# Patient Record
Sex: FEMALE | Race: ASIAN
[De-identification: names, ages, dates, MRNs, and addresses within clinical notes are randomized per-mention and may not be internally consistent; named-entity substitution may affect disease eponyms.]

---

## 2017-10-21 ENCOUNTER — HOSPITAL ENCOUNTER (INPATIENT)
Dept: HOSPITAL 74 - JER | Age: 67
LOS: 6 days | Discharge: HOME | DRG: 141 | End: 2017-10-27
Attending: FAMILY MEDICINE | Admitting: FAMILY MEDICINE
Payer: COMMERCIAL

## 2017-10-21 VITALS — BODY MASS INDEX: 24.3 KG/M2

## 2017-10-21 DIAGNOSIS — R00.0: ICD-10-CM

## 2017-10-21 DIAGNOSIS — E11.65: ICD-10-CM

## 2017-10-21 DIAGNOSIS — J45.901: Primary | ICD-10-CM

## 2017-10-21 DIAGNOSIS — Z77.22: ICD-10-CM

## 2017-10-21 DIAGNOSIS — Z79.4: ICD-10-CM

## 2017-10-21 DIAGNOSIS — I10: ICD-10-CM

## 2017-10-21 LAB
ALBUMIN SERPL-MCNC: 3.2 G/DL (ref 3.4–5)
ALP SERPL-CCNC: 78 U/L (ref 45–117)
ALT SERPL-CCNC: 24 U/L (ref 12–78)
ANION GAP SERPL CALC-SCNC: 4 MMOL/L (ref 8–16)
AST SERPL-CCNC: 8 U/L (ref 15–37)
BASOPHILS # BLD: 0.6 % (ref 0–2)
BILIRUB SERPL-MCNC: 0.7 MG/DL (ref 0.2–1)
CALCIUM SERPL-MCNC: 8.2 MG/DL (ref 8.5–10.1)
CK SERPL-CCNC: 76 IU/L (ref 26–192)
CO2 SERPL-SCNC: 33 MMOL/L (ref 21–32)
CREAT SERPL-MCNC: 0.7 MG/DL (ref 0.55–1.02)
DEPRECATED RDW RBC AUTO: 12.8 % (ref 11.6–15.6)
EOSINOPHIL # BLD: 2.2 % (ref 0–4.5)
GLUCOSE SERPL-MCNC: 212 MG/DL (ref 74–106)
MCH RBC QN AUTO: 30.6 PG (ref 25.7–33.7)
MCHC RBC AUTO-ENTMCNC: 33.1 G/DL (ref 32–36)
MCV RBC: 92.6 FL (ref 80–96)
NEUTROPHILS # BLD: 56.6 % (ref 42.8–82.8)
PLATELET # BLD AUTO: 184 K/MM3 (ref 134–434)
PMV BLD: 7.5 FL (ref 7.5–11.1)
PROT SERPL-MCNC: 6.5 G/DL (ref 6.4–8.2)
TROPONIN I SERPL-MCNC: < 0.02 NG/ML (ref 0–0.05)
WBC # BLD AUTO: 11.9 K/MM3 (ref 4–10)

## 2017-10-21 PROCEDURE — G0378 HOSPITAL OBSERVATION PER HR: HCPCS

## 2017-10-21 RX ADMIN — HEPARIN SODIUM SCH UNIT: 5000 INJECTION, SOLUTION INTRAVENOUS; SUBCUTANEOUS at 22:42

## 2017-10-21 NOTE — PDOC
History of Present Illness





<Adeline Warner - Last Filed: 10/21/17 18:08>





<Bianka Osorio - Last Filed: 10/22/17 03:54>





- General


Chief Complaint: Respiratory


Stated Complaint: DIFFICULTY BREATHING





Past History





- Past Medical History


Asthma: Yes





- Suicide/Smoking/Psychosocial Hx


Smoking History: Never smoked


Have you smoked in the past 12 months: No


Hx Alcohol Use: No


Drug/Substance Use Hx: No


Substance Use Type: None


Hx Substance Use Treatment: No





<Adeline Warner - Last Filed: 10/21/17 18:08>





<Bianka Osorio - Last Filed: 10/22/17 03:54>





- Past Medical History


Allergies/Adverse Reactions: 


 Allergies











Allergy/AdvReac Type Severity Reaction Status Date / Time


 


No Known Drug Allergies Allergy   Verified 10/21/17 17:40











Home Medications: 


Ambulatory Orders





Albuterol Sulfate Inhaler - 2 puff IN PRN 10/22/17 


Prednisone [Deltasone -] 20 mg PO DAILY 10/22/17 











*Physical Exam





- Vital Signs


 Last Vital Signs











Temp Pulse Resp BP Pulse Ox


 


 98.9 F   87   20   152/87   92 L


 


 10/21/17 17:37  10/21/17 17:37  10/21/17 17:37  10/21/17 17:37  10/21/17 17:37














<Adeline Warner - Last Filed: 10/21/17 18:08>





- Vital Signs


 Last Vital Signs











Temp Pulse Resp BP Pulse Ox


 


 98.9 F   87   20   152/87   92 L


 


 10/21/17 17:37  10/21/17 17:37  10/21/17 17:37  10/21/17 17:37  10/21/17 17:37














<Bianka Osorio - Last Filed: 10/22/17 03:54>





ED Treatment Course





- RADIOLOGY


Radiology Studies Ordered: 














 Category Date Time Status


 


 CXRPORT [CHEST X-RAY PORTABLE*] [RAD] [facility]  10/21/17 17:57 Ordered














<Adeline Warner - Last Filed: 10/21/17 18:08>





- LABORATORY


CBC & Chemistry Diagram: 


 10/21/17 18:27





 10/21/17 19:10





- ADDITIONAL ORDERS


Additional order review: 


 Laboratory  Results











  10/21/17 10/21/17 10/21/17





  19:10 19:10 18:27


 


Sodium  140  


 


Potassium  3.8  


 


Chloride  103  


 


Carbon Dioxide  33 H  


 


Anion Gap  4 L  


 


BUN  21 H  


 


Creatinine  0.7  


 


Creat Clearance w eGFR  > 60  


 


Random Glucose  212 H D  


 


Calcium  8.2 L  


 


Total Bilirubin  0.7  D  


 


AST  8 L D  


 


ALT  24  


 


Alkaline Phosphatase  78  


 


Creatine Kinase   76  Cancelled


 


Troponin I   < 0.02  Cancelled


 


B-Natriuretic Peptide  301.29 H  


 


Total Protein  6.5  


 


Albumin  3.2 L  














  10/21/17





  18:27


 


Sodium  Cancelled


 


Potassium  Cancelled


 


Chloride  Cancelled


 


Carbon Dioxide  Cancelled


 


Anion Gap  Cancelled


 


BUN  Cancelled


 


Creatinine  Cancelled


 


Creat Clearance w eGFR  Cancelled


 


Random Glucose  Cancelled


 


Calcium  Cancelled


 


Total Bilirubin  Cancelled


 


AST  Cancelled


 


ALT  Cancelled


 


Alkaline Phosphatase  Cancelled


 


Creatine Kinase 


 


Troponin I 


 


B-Natriuretic Peptide  Cancelled


 


Total Protein  Cancelled


 


Albumin  Cancelled








 











  10/21/17





  18:27


 


RBC  4.39


 


MCV  92.6


 


MCHC  33.1


 


RDW  12.8


 


MPV  7.5


 


Neutrophils %  56.6  D


 


Lymphocytes %  30.9  D


 


Monocytes %  9.7


 


Eosinophils %  2.2


 


Basophils %  0.6














- Medications


Given in the ED: 


ED Medications














Discontinued Medications














Generic Name Dose Route Start Last Admin





  Trade Name Freq  PRN Reason Stop Dose Admin


 


Albuterol/Ipratropium  1 amp 10/21/17 17:58 10/21/17 18:05





  Duoneb -  NEB 10/21/17 17:59  1 amp





  ONCE ONE   Administration


 


Albuterol/Ipratropium  1 amp 10/21/17 19:19 10/21/17 18:50





  Duoneb -  NEB 10/21/17 19:20  1 amp





  ONCE ONE   Administration


 


Magnesium Sulfate  2 gm 10/21/17 19:19 10/21/17 18:50





  Magnesium Sulfate  IVPB 10/21/17 19:20  2 gm





  ONCE ONE   Administration


 


Methylprednisolone Sodium Succinate  125 mg 10/21/17 18:07 10/21/17 18:10





  Solu-Medrol -  IVPUSH 10/21/17 18:08  125 mg





  ONCE ONE   Administration














<Bianka Osorio - Last Filed: 10/22/17 03:54>





Medical Decision Making





- Medical Decision Making





10/21/17 18:08


68yo F with y/o asthma, htn, here with c/o sob wheezing x one week. using 

albuterl minimal relief.  [facility], and was started on prednisone

, finished few days ago and sob and wheezing worse.  today went for second eval 

at asthma center, found to be hypoxic at 92%, sent to ed.


 no f/c no sob no chest pain. no leg swelling.  no h/o pe., no h/o intubations, 

last exacerbation one year ago, admitted at that time. no icu admission.





on exam awake labored breathing. bilat crackles at bases, with wheezing 

tachypnea, heart rrr nomrg. abd soft ntn nd. ext wwp no edema. no calf 

tenderness.





plan : nebs steroids iv, cbc lytse bnp, trop ekg cxr. 





<Adeline Warner - Last Filed: 10/21/17 18:08>





- Medical Decision Making


10/21/17 20:41


Pt remains at 91% O2 sat on room air despite all the treatments she has been 

receiving.


Pt will be admitted to PMD [facility]; Yoellaura is aware of the patient and wants her 

to go to Med/obs unit





10/21/17 21:56


Pt's EKG is NSR done at 20:07; heart rate 95.


After terbulating 0.25ml SQ, pt became tachycardic to 160 and BP is also 

systolic 160.








10/21/17 22:16


Diltiazem 10mg IV given to patient; Heart rate returned to 70s and BP came down 

to 107 systolic; Pt's repeat EKG demonstrates HR of 114. Pt is a bit anxious.  

Rectal temp 98.8.  She has no fever, though she appears flushed.





10/22/17 03:53


Pt was upgraded to telemetry.


Her CXR is normal: 





Patient Name: GEOVANNI RUIZ


THIS IS A PRELIMINARY REPORT FROM IMAGING ON CALL


DATE OF SERVICE: 2017-10-21 18:08:06


IMAGES: 2


EXAM: XR CHEST


HISTORY:Shortness of breath


COMPARISON: None.


FINDINGS:The cardiomediastinal silhouette is normal. The lungs are clear. No 

pleural effusion.


IMPRESSION: Normal chest.


THIS DOCUMENT HAS BEEN ELECTRONICALLY SIGNED





<Bianka Osorio - Last Filed: 10/22/17 03:54>





*DC/Admit/Observation/Transfer





<Adeline Warner - Last Filed: 10/21/17 18:08>





- Discharge Dispostion


Admit: Yes





<Bianka Osorio - Last Filed: 10/22/17 03:54>


Diagnosis at time of Disposition: 


 Asthma exacerbation, Second hand smoke exposure, Elevated blood sugar level





- Discharge Dispostion


Disposition: HOME


Condition at time of disposition: Guarded

## 2017-10-22 LAB
BASOPHILS # BLD: 0 % (ref 0–2)
DEPRECATED RDW RBC AUTO: 12.6 % (ref 11.6–15.6)
EOSINOPHIL # BLD: 0 % (ref 0–4.5)
MCH RBC QN AUTO: 30.5 PG (ref 25.7–33.7)
MCHC RBC AUTO-ENTMCNC: 32.7 G/DL (ref 32–36)
MCV RBC: 93.2 FL (ref 80–96)
NEUTROPHILS # BLD: 82.5 % (ref 42.8–82.8)
PLATELET # BLD AUTO: 177 K/MM3 (ref 134–434)
PMV BLD: 7.2 FL (ref 7.5–11.1)
WBC # BLD AUTO: 11.8 K/MM3 (ref 4–10)

## 2017-10-22 RX ADMIN — HEPARIN SODIUM SCH UNIT: 5000 INJECTION, SOLUTION INTRAVENOUS; SUBCUTANEOUS at 09:19

## 2017-10-22 RX ADMIN — ALBUTEROL SULFATE SCH AMP: 2.5 SOLUTION RESPIRATORY (INHALATION) at 11:47

## 2017-10-22 RX ADMIN — ALBUTEROL SULFATE SCH AMP: 2.5 SOLUTION RESPIRATORY (INHALATION) at 06:45

## 2017-10-22 RX ADMIN — BUDESONIDE AND FORMOTEROL FUMARATE DIHYDRATE SCH PUFF: 80; 4.5 AEROSOL RESPIRATORY (INHALATION) at 16:53

## 2017-10-22 RX ADMIN — GUAIFENESIN PRN ML: 100 SOLUTION ORAL at 14:43

## 2017-10-22 RX ADMIN — HEPARIN SODIUM SCH UNIT: 5000 INJECTION, SOLUTION INTRAVENOUS; SUBCUTANEOUS at 21:25

## 2017-10-22 RX ADMIN — METHYLPREDNISOLONE SODIUM SUCCINATE SCH MG: 40 INJECTION, POWDER, FOR SOLUTION INTRAMUSCULAR; INTRAVENOUS at 21:24

## 2017-10-22 RX ADMIN — METHYLPREDNISOLONE SODIUM SUCCINATE SCH MG: 40 INJECTION, POWDER, FOR SOLUTION INTRAMUSCULAR; INTRAVENOUS at 14:57

## 2017-10-22 RX ADMIN — ALBUTEROL SULFATE SCH AMP: 1.25 SOLUTION RESPIRATORY (INHALATION) at 23:04

## 2017-10-22 RX ADMIN — MONTELUKAST SODIUM SCH MG: 10 TABLET, COATED ORAL at 21:25

## 2017-10-22 RX ADMIN — BUDESONIDE AND FORMOTEROL FUMARATE DIHYDRATE SCH PUFF: 80; 4.5 AEROSOL RESPIRATORY (INHALATION) at 21:29

## 2017-10-22 RX ADMIN — ALBUTEROL SULFATE SCH: 2.5 SOLUTION RESPIRATORY (INHALATION) at 00:05

## 2017-10-22 NOTE — HP
Admitting History and Physical





- Primary Care Physician


PCP: Halima Eden





- Admission


Chief Complaint: Asthma exacerbation


History of Present Illness: 


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 

asthma, htn, here with c/o sob wheezing x one week. using rescue inhaler with 

minimal relief.  [facility], and was started on prednisone, 

finished few days ago and sob and wheezing became worse.  Went for second eval 

at asthma center, to have spo2 92%, sent to ed.  No h/o pe., no h/o intubations

, last exacerbation one year ago, admitted at that time. no icu admissions.





She states she received influenza vaccine a few days prior to her getting sick 

and also took care of a sick toddler with URI


went to [place] in Aug-September. Has PPD implanted this AM on her LFA








History Source: Patient


Limitations to Obtaining History: No Limitations





- Past Medical History


CNS: No: Alzheimer's


Cardiovascular: Yes: Other (routine nuclear stress test about 1 ya -. 

reportedly normal).  No: AFIB


Pulmonary: Yes: Asthma ( -. not active)


Gastrointestinal: No: Ascites


Hepatobiliary: No: Cirrhosis


Renal/: No: Renal Failure


Heme/Onc: No: Anemia


Infectious Disease: No: AIDS


Psych: No: Addictions


Musculoskeletal: No: Chronic low back pain


Rheumatology: No: Fibromyalgia


ENT: No: Allergic Rhinitis


Endocrine: Yes: Diabetes Mellitus





- Smoking History


Smoking history: Never smoked


Have you smoked in the past 12 months: No





- Alcohol/Substance Use


Hx Alcohol Use: No


History of Substance Use: reports: None





- Social History


History of Recent Travel: No





Home Medications





- Allergies


Allergies/Adverse Reactions: 


 Allergies











Allergy/AdvReac Type Severity Reaction Status Date / Time


 


No Known Drug Allergies Allergy   Verified 10/21/17 17:40














- Home Medications


Home Medications: 


Ambulatory Orders





Albuterol Sulfate Inhaler - 2 puff IN PRN 10/22/17 


Prednisone [Deltasone -] 20 mg PO DAILY 10/22/17 











Review of Systems





- Review of Systems


Constitutional: reports: Chills, Fever, Night Sweats


Eyes: reports: No Symptoms


HENT: reports: No Symptoms


Neck: reports: No Symptoms


Cardiovascular: reports: Shortness of Breath


Respiratory: reports: Cough, SOB, Wheezing


Gastrointestinal: reports: No Symptoms


Genitourinary: reports: No Symptoms


Breasts: reports: No Symptoms Reported


Musculoskeletal: reports: No Symptoms


Integumentary: reports: No Symptoms


Neurological: reports: No Symptoms


Endocrine: reports: Increased Thirst


Hematology/Lymphatic: reports: No Symptoms


Psychiatric: reports: No Symptoms





Physical Examination


Vital Signs: 


 Vital Signs











Temperature  98.0 F   10/22/17 20:16


 


Pulse Rate  82   10/22/17 20:16


 


Respiratory Rate  20   10/22/17 20:16


 


Blood Pressure  143/74   10/22/17 20:16


 


O2 Sat by Pulse Oximetry (%)  94 L  10/22/17 20:16











Findings/Remarks: 


on BIPAP currently, helps her breathe better


Constitutional: Yes: Well Nourished, No Distress, Calm


Cardiovascular: Yes: Regular Rate and Rhythm


Respiratory: Yes: Regular


Gastrointestinal: Yes: Normal Bowel Sounds


Musculoskeletal: Yes: WNL


Extremities: Yes: WNL


Edema: No


Peripheral Pulses WNL: Yes


Neurological: Yes: Alert, Oriented


Psychiatric: Yes: Alert, Oriented


Labs: 


 CBC, BMP





 10/22/17 05:35 











Imaging





- Results


Chest X-ray: Report Reviewed





Problem List





- Problems


(1) Asthma exacerbation


Assessment/Plan: 


-seen by Pulmonary


-IV steroids


-neb tx


-bipap


-nasal o2 to maintain Spo2> 90%





Code(s): J45.901 - UNSPECIFIED ASTHMA WITH (ACUTE) EXACERBATION   





(2) Second hand smoke exposure


Code(s): Z77.22 - CNTCT W AND EXPSR TO ENVIRON TOBACCO SMOKE (ACUTE) (CHRONIC)





(3) Diabetes type 2, controlled


Assessment/Plan: 


-recheck A1c


Code(s): E11.9 - TYPE 2 DIABETES MELLITUS WITHOUT COMPLICATIONS   





(4) Tachycardia


Assessment/Plan: 


-seen by cardiology


Code(s): R00.0 - TACHYCARDIA, UNSPECIFIED

## 2017-10-22 NOTE — CON.CARD
Consult


Consult Specialty:: Cardiology


Reason for Consultation:: Tachcardia





- History of Present Illness


Chief Complaint: Asthma exacerbation


History of Present Illness: 


This is a 67 year old female with a PMH of asthma. She was complaining of GRIMALDO 

and chest pain in 8/17 and had a nuclear stress test and an echocardiogram at 

[facility], both she was told were normal. She presents now with an asthma 

exacerbation and is noted to have tachycardia on the telem monitor. In 

reviewing telemtry, it appears to me to be sinus tachycardia with periods of 

MAT. 





EKG 10/21/17 NSR at 82 BPM with normal intervals, normal axis and NSSTTW 

changes. 





- Past Medical History


CNS: No: Alzheimer's


Cardio/Vascular: Yes: Other (routine nuclear stress test about 1 ya -. 

reportedly normal).  No: AFIB


Pulmonary: Yes: Asthma ( -. not active)


Gastrointestinal: No: Ascites


Hepatobiliary: No: Cirrhosis


Renal/: No: Renal Failure


Infectious Disease: No: AIDS


Psych: No: Addictions


Musculoskeletal: No: Chronic low back pain


Rheumatology: No: Fibromyalgia


ENT: No: Allergic Rhinitis


Endocrine: Yes: Diabetes Mellitus





- Alcohol/Substance Use


Hx Alcohol Use: No


History of Substance Use: reports: None





- Smoking History


Smoking history: Never smoked


Have you smoked in the past 12 months: No





- Social History


History of Recent Travel: No





Home Medications





- Allergies


Allergies/Adverse Reactions: 


 Allergies











Allergy/AdvReac Type Severity Reaction Status Date / Time


 


No Known Drug Allergies Allergy   Verified 10/21/17 17:40














- Home Medications


Home Medications: 


Ambulatory Orders





Albuterol Sulfate Inhaler - 2 puff IN PRN 10/22/17 


Prednisone [Deltasone -] 20 mg PO DAILY 10/22/17 











Review of Systems


Unable to obtain ROS, reason: As per HPI


Vital Signs: 


 Vital Signs











Temperature  99 F   10/22/17 14:00


 


Pulse Rate  71   10/22/17 14:00


 


Respiratory Rate  20   10/22/17 14:00


 


Blood Pressure  163/69   10/22/17 14:00


 


O2 Sat by Pulse Oximetry (%)  95   10/22/17 09:00











Constitutional: Yes: No Distress


HENT: Yes: WNL


Neck: Yes: WNL


Respiratory: Yes: Wheezes (Bilateral expiratory wheezing)


Gastrointestinal: Yes: Soft


Heart Sounds: Yes: S1, S2 (NL S1S2, no MRHG)


Edema: No


Neurological: Yes: Alert, Oriented (Grossly nonfocal)





- Other Data


Labs, Other Data: 


 CBC, BMP





 10/22/17 05:35 











Assessment/Plan


Tachycardia


Presently with sinus tachycardia


Earlier SVT noted that appeared to be MAT





The MAT should resolve as the underlying pulmonary disease is being treated


Keep monitored for now


Would not attempt to artificially slow the HR at this point





Will follow with you

## 2017-10-22 NOTE — CON.PULM
Consult


Consult Specialty:: PULMONARY


Referred by:: LOLIS


Reason for Consultation:: ASTHMA





- History of Present Illness


Chief Complaint: SOB/COUGH/WHEEZE


History of Present Illness: 





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 

asthma, htn, here with c/o sob wheezing x one week. using rescue inhaler with 

minimal relief.  [facility], and was started on prednisone, 

finished few days ago and sob and wheezing became worse.  Went for second eval 

at asthma center, to have spo2 92%, sent to ed.  No h/o pe., no h/o intubations

, last exacerbation one year ago, admitted at that time. no icu admissions.








- History Source


History Provided By: Patient, Medical Record


Limitations to Obtaining History: No Limitations





- Past Medical History


CNS: No: Alzheimer's


Cardio/Vascular: Yes: Other (routine nuclear stress test about 1 ya -. 

reportedly normal).  No: AFIB


Pulmonary: Yes: Asthma ( -. not active)


Gastrointestinal: No: Ascites


Hepatobiliary: No: Cirrhosis


Renal/: No: Renal Failure


Reproductive: Yes: Postmenopausal


Heme/Onc: No: Anemia


Infectious Disease: No: AIDS


Psych: No: Addictions


Musculoskeletal: No: Chronic low back pain


Rheumatology: No: Fibromyalgia


ENT: No: Allergic Rhinitis


Endocrine: Yes: Diabetes Mellitus





- Alcohol/Substance Use


Hx Alcohol Use: No


History of Substance Use: reports: None





- Smoking History


Smoking history: Never smoked


Have you smoked in the past 12 months: No





- Social History


Place of Birth: Other


History of Recent Travel: No





Home Medications





- Allergies


Allergies/Adverse Reactions: 


 Allergies











Allergy/AdvReac Type Severity Reaction Status Date / Time


 


No Known Drug Allergies Allergy   Verified 10/21/17 17:40














- Home Medications


Home Medications: 


Ambulatory Orders





Albuterol Sulfate Inhaler - 2 puff IN PRN 10/22/17 


Prednisone [Deltasone -] 20 mg PO DAILY 10/22/17 











Family Disease History





- Family Disease History


Family History: Unremarkable





Review of Systems





- Review of Systems


Cardiovascular: reports: Palpitations, Shortness of Breath.  denies: Chest Pain


Respiratory: reports: Cough, Exercise Intolerance, SOB, SOB on Exertion, 

Wheezing.  denies: Hemoptysis





Physical Exam


Vital Sings: 


 Vital Signs











Temperature  97.8 F   10/22/17 09:00


 


Pulse Rate  77   10/22/17 09:00


 


Respiratory Rate  20   10/22/17 09:00


 


Blood Pressure  122/68   10/22/17 09:00


 


O2 Sat by Pulse Oximetry (%)  95   10/22/17 09:00











Constitutional: Yes: Calm


Eyes: Yes: EOM Intact


HENT: Yes: Normocephalic


Neck: Yes: Trachea Midline


Cardiovascular: Yes: Tachycardia, S1, S2


Respiratory: Yes: Diminished


Gastrointestinal: Yes: Normal Bowel Sounds


Edema: No


Labs: 


 CBC, BMP





 10/22/17 05:35 











Imaging





- Results


Chest X-ray: Report Reviewed, Image Reviewed





Problem List





- Problems


(1) Asthma exacerbation


Code(s): J45.901 - UNSPECIFIED ASTHMA WITH (ACUTE) EXACERBATION   





(2) Elevated blood sugar level


Code(s): R73.9 - HYPERGLYCEMIA, UNSPECIFIED





(3) Diabetes type 2, controlled


Code(s): E11.9 - TYPE 2 DIABETES MELLITUS WITHOUT COMPLICATIONS   








Assessment/Plan


ACUTE ASTHMATIC BRONCHITIS/NO RADIOGRAPHIC EVIDENCE OF PNEUMONIA


HYPERGLYCEMIA


MARK ANTHONY CAUSES TACHYCARDIA





IV STEROIDS/ANTIBIOTICS/ICS/LABA REDUCED DOSE OF MARK ANTHONY VIA NEB


MONITOR ON TELE


DAILY PEAK FLOW


CHECK IGE LEVEL/RAST





THANK YOU





WILL FOLLOW WITH YOU





RILEY RODRIGUEZ MD

## 2017-10-22 NOTE — EKG
Test Reason : 

Blood Pressure : ***/*** mmHG

Vent. Rate : 082 BPM     Atrial Rate : 082 BPM

   P-R Int : 138 ms          QRS Dur : 086 ms

    QT Int : 378 ms       P-R-T Axes : 044 002 080 degrees

   QTc Int : 441 ms

 

NORMAL SINUS RHYTHM

NONSPECIFIC T WAVE ABNORMALITY

ABNORMAL ECG

WHEN COMPARED WITH ECG OF 15-SEP-2016 08:31,

NONSPECIFIC T WAVE ABNORMALITY, WORSE IN LATERAL LEADS

CLINICAL CORRELATION IS RECOMMENDED

Confirmed by CARLOS NICHOLAS, CRISTHIAN ([id number]) on 10/22/2017 10:52:07 AM

 

Referred By:             Confirmed By:CRISTHIAN GONZALEZ MD

## 2017-10-23 LAB
ALBUMIN SERPL-MCNC: 3.1 G/DL (ref 3.4–5)
ALP SERPL-CCNC: 74 U/L (ref 45–117)
ALT SERPL-CCNC: 25 U/L (ref 12–78)
ANION GAP SERPL CALC-SCNC: 11 MMOL/L (ref 8–16)
AST SERPL-CCNC: 9 U/L (ref 15–37)
BASOPHILS # BLD: 0.4 % (ref 0–2)
BILIRUB SERPL-MCNC: 0.5 MG/DL (ref 0.2–1)
CALCIUM SERPL-MCNC: 8.2 MG/DL (ref 8.5–10.1)
CHOLEST SERPL-MCNC: 203 MG/DL (ref 50–200)
CO2 SERPL-SCNC: 26 MMOL/L (ref 21–32)
CREAT SERPL-MCNC: 0.5 MG/DL (ref 0.55–1.02)
DEPRECATED RDW RBC AUTO: 12.6 % (ref 11.6–15.6)
EOSINOPHIL # BLD: 0.1 % (ref 0–4.5)
GLUCOSE SERPL-MCNC: 229 MG/DL (ref 74–106)
MCH RBC QN AUTO: 30.2 PG (ref 25.7–33.7)
MCHC RBC AUTO-ENTMCNC: 32.7 G/DL (ref 32–36)
MCV RBC: 92.2 FL (ref 80–96)
NEUTROPHILS # BLD: 84.3 % (ref 42.8–82.8)
PLATELET # BLD AUTO: 182 K/MM3 (ref 134–434)
PMV BLD: 7.6 FL (ref 7.5–11.1)
PROT SERPL-MCNC: 6.5 G/DL (ref 6.4–8.2)
WBC # BLD AUTO: 16.2 K/MM3 (ref 4–10)

## 2017-10-23 RX ADMIN — METHYLPREDNISOLONE SODIUM SUCCINATE SCH MG: 40 INJECTION, POWDER, FOR SOLUTION INTRAMUSCULAR; INTRAVENOUS at 08:52

## 2017-10-23 RX ADMIN — HEPARIN SODIUM SCH UNIT: 5000 INJECTION, SOLUTION INTRAVENOUS; SUBCUTANEOUS at 21:42

## 2017-10-23 RX ADMIN — METHYLPREDNISOLONE SODIUM SUCCINATE SCH MG: 40 INJECTION, POWDER, FOR SOLUTION INTRAMUSCULAR; INTRAVENOUS at 21:42

## 2017-10-23 RX ADMIN — CEFTRIAXONE SCH MLS/HR: 1 INJECTION, SOLUTION INTRAVENOUS at 10:21

## 2017-10-23 RX ADMIN — MONTELUKAST SODIUM SCH MG: 10 TABLET, COATED ORAL at 21:44

## 2017-10-23 RX ADMIN — IPRATROPIUM BROMIDE AND ALBUTEROL SULFATE PRN AMP: .5; 3 SOLUTION RESPIRATORY (INHALATION) at 22:43

## 2017-10-23 RX ADMIN — METHYLPREDNISOLONE SODIUM SUCCINATE SCH MG: 40 INJECTION, POWDER, FOR SOLUTION INTRAMUSCULAR; INTRAVENOUS at 02:30

## 2017-10-23 RX ADMIN — BUDESONIDE AND FORMOTEROL FUMARATE DIHYDRATE SCH PUFF: 80; 4.5 AEROSOL RESPIRATORY (INHALATION) at 10:31

## 2017-10-23 RX ADMIN — TIOTROPIUM BROMIDE SCH PUFF: 18 CAPSULE ORAL; RESPIRATORY (INHALATION) at 09:09

## 2017-10-23 RX ADMIN — BUDESONIDE AND FORMOTEROL FUMARATE DIHYDRATE SCH PUFF: 160; 4.5 AEROSOL RESPIRATORY (INHALATION) at 21:44

## 2017-10-23 RX ADMIN — HEPARIN SODIUM SCH UNIT: 5000 INJECTION, SOLUTION INTRAVENOUS; SUBCUTANEOUS at 09:00

## 2017-10-23 RX ADMIN — INSULIN ASPART SCH UNITS: 100 INJECTION, SOLUTION INTRAVENOUS; SUBCUTANEOUS at 21:42

## 2017-10-23 RX ADMIN — ALBUTEROL SULFATE SCH AMP: 1.25 SOLUTION RESPIRATORY (INHALATION) at 06:25

## 2017-10-23 RX ADMIN — METHYLPREDNISOLONE SODIUM SUCCINATE SCH MG: 40 INJECTION, POWDER, FOR SOLUTION INTRAMUSCULAR; INTRAVENOUS at 14:54

## 2017-10-23 RX ADMIN — GUAIFENESIN PRN ML: 100 SOLUTION ORAL at 02:44

## 2017-10-23 NOTE — PN
Progress Note, Physician


Chief Complaint: 


AWAKE ON BIPAP


MILD DISTRESS





- Current Medication List


Current Medications: 


Active Medications





Acetaminophen (Tylenol -)  650 mg PO Q6H PRN


   PRN Reason: FEVER OR PAIN


Albuterol/Ipratropium (Duoneb -)  1 amp NEB Q4H PRN


   PRN Reason: SHORTNESS OF BREATH


Budesonide/Formoterol Fumarate (Symbicort 160/4.5mcg -)  2 puff IH BID ADAM


Guaifenesin (Robitussin -)  10 ml PO Q6H PRN


   Last Admin: 10/23/17 02:44 Dose:  10 ml


Heparin Sodium (Porcine) (Heparin -)  5,000 unit SQ BID [facility]


   Last Admin: 10/23/17 09:00 Dose:  5,000 unit


CEFTRIAXONE 1 G/50 ML PREMIX (Ceftriaxone 1 Gm-D5w Bag)  50 mls @ 100 mls/hr 

IVPB DAILY [facility]


   Stop: 10/24/17 10:30


   Last Admin: 10/23/17 10:21 Dose:  100 mls/hr


Methylprednisolone Sodium Succinate (Solu-Medrol -)  40 mg IVPUSH Q6H-IV [facility]


   Last Admin: 10/23/17 14:54 Dose:  40 mg


Montelukast Sodium (Singulair -)  10 mg PO HS [facility]


   Last Admin: 10/22/17 21:25 Dose:  10 mg


Tiotropium Bromide (Spiriva -)  1 puff IH DAILY [facility]


   Last Admin: 10/23/17 09:09 Dose:  1 puff











- Objective


Vital Signs: 


 Vital Signs











Temperature  97.8 F   10/23/17 15:02


 


Pulse Rate  68   10/23/17 15:02


 


Respiratory Rate  20   10/23/17 15:02


 


Blood Pressure  148/68   10/23/17 15:02


 


O2 Sat by Pulse Oximetry (%)  99   10/23/17 14:00











Constitutional: Yes: Mild Distress


Eyes: Yes: WNL


HENT: Yes: WNL


Neck: Yes: WNL


Cardiovascular: Yes: WNL


Respiratory: Yes: On BiPap, Rhonchi, SOB


Gastrointestinal: Yes: WNL


Genitourinary: Yes: WNL


Musculoskeletal: Yes: WNL


Extremities: Yes: WNL


Edema: No


Peripheral Pulses WNL: Yes


Integumentary: Yes: WNL


Wound/Incision: Yes: Clean/Dry


Neurological: Yes: WNL


...Motor Strength: WNL


Psychiatric: Yes: WNL





Problem List





- Problems


(1) Asthma exacerbation


Code(s): J45.901 - UNSPECIFIED ASTHMA WITH (ACUTE) EXACERBATION   Qualifiers: 


     Asthma severity: moderate





(2) Tachycardia


Code(s): R00.0 - TACHYCARDIA, UNSPECIFIED





(3) Diabetes type 2, controlled


Code(s): E11.9 - TYPE 2 DIABETES MELLITUS WITHOUT COMPLICATIONS   Qualifiers: 


     Diabetes mellitus complication status: without complication








Assessment/Plan


IV STEROIDS


SSI


ADA


BIPAP


RESP EVAL APPRECIATED


NEBS


OOB TO CHAIR

## 2017-10-23 NOTE — PN
Progress Note, Physician


History of Present Illness: 


PULMONARY





ALERT,FEELING BETTER,LESS DYSPNEIC





- Current Medication List


Current Medications: 


Active Medications





Acetaminophen (Tylenol -)  650 mg PO Q6H PRN


   PRN Reason: FEVER OR PAIN


Albuterol Sulfate (Ventolin 0.042trength) -)  1 amp [facility]B [facility]


   Last Admin: 10/23/17 06:25 Dose:  1 amp


Budesonide/Formoterol Fumarate (Symbicort 80/4.5mcg -)  1 puff IH BID [facility]


   Last Admin: 10/23/17 10:31 Dose:  1 puff


Guaifenesin (Robitussin -)  10 ml PO Q6H PRN


   Last Admin: 10/23/17 02:44 Dose:  10 ml


Heparin Sodium (Porcine) (Heparin -)  5,000 unit SQ BID [facility]


   Last Admin: 10/23/17 09:00 Dose:  5,000 unit


CEFTRIAXONE 1 G/50 ML PREMIX (Ceftriaxone 1 Gm-D5w Bag)  50 mls @ 100 mls/hr 

IVPB DAILY [facility]


   Stop: 10/24/17 10:30


   Last Admin: 10/23/17 10:21 Dose:  100 mls/hr


Methylprednisolone Sodium Succinate (Solu-Medrol -)  40 mg IVPUSH Q6H-IV [facility]


   Last Admin: 10/23/17 08:52 Dose:  40 mg


Montelukast Sodium (Singulair -)  10 mg PO HS [facility]


   Last Admin: 10/22/17 21:25 Dose:  10 mg


Tiotropium Bromide (Spiriva -)  1 puff IH DAILY [facility]


   Last Admin: 10/23/17 09:09 Dose:  1 puff











- Objective


Vital Signs: 


 Vital Signs











Temperature  97.3 F L  10/23/17 06:00


 


Pulse Rate  62   10/23/17 06:00


 


Respiratory Rate  20   10/23/17 06:00


 


Blood Pressure  131/70   10/23/17 06:00


 


O2 Sat by Pulse Oximetry (%)  96   10/23/17 07:13











Constitutional: Yes: Well Nourished, Calm


Eyes: Yes: WNL


HENT: Yes: WNL


Neck: Yes: WNL


Cardiovascular: Yes: Regular Rate and Rhythm, S1, S2


Respiratory: Yes: Diminished, Wheezes (SCATTERED DEVYN WHEEZES)


Gastrointestinal: Yes: Normal Bowel Sounds, Soft


Extremities: Yes: WNL


Edema: No


Labs: 


 CBC, BMP





 10/23/17 06:25 





 10/23/17 06:25 











Assessment/Plan


Problem List





- Problems


(1) Asthma exacerbation


Code(s): J45.901 - UNSPECIFIED ASTHMA WITH (ACUTE) EXACERBATION   





(2) Elevated blood sugar level


Code(s): R73.9 - HYPERGLYCEMIA, UNSPECIFIED





(3) Diabetes type 2, controlled


Code(s): E11.9 - TYPE 2 DIABETES MELLITUS WITHOUT COMPLICATIONS   








Assessment/Plan


ACUTE ASTHMATIC BRONCHITIS/NO RADIOGRAPHIC EVIDENCE OF PNEUMONIA


HYPERGLYCEMIA








IV STEROIDS


ANTIBIOTICS


ICS


LABA


NEBS PRN


MONITOR ON TELE


DAILY PEAK FLOW


CHECK IGE LEVEL/RAST OUTPATIENT


PFTS OUTPATIENT





DR MAI

## 2017-10-23 NOTE — PN
Progress Note, Physician


Chief Complaint: 


SOB improving but still wheezy


History of Present Illness: 


This is a 67 year old female with a PMH of asthma. She was complaining of GRIMALDO 

and chest pain in 8/17 and had a nuclear stress test and an echocardiogram at 

[facility], both she was told were normal. She presents now with an asthma 

exacerbation and is noted to have tachycardia on the telem monitor. In 

reviewing telemtry, it appears to me to be sinus tachycardia with periods of 

MAT. 





EKG 10/21/17 NSR at 82 BPM with normal intervals, normal axis and NSSTTW 

changes.





- Current Medication List


Current Medications: 


Active Medications





Acetaminophen (Tylenol -)  650 mg PO Q6H PRN


   PRN Reason: FEVER OR PAIN


Albuterol/Ipratropium (Duoneb -)  1 amp NEB Q4H PRN


   PRN Reason: SHORTNESS OF BREATH


Budesonide/Formoterol Fumarate (Symbicort 160/4.5mcg -)  2 puff IH BID ADAM


Guaifenesin (Robitussin -)  10 ml PO Q6H PRN


   Last Admin: 10/23/17 02:44 Dose:  10 ml


Heparin Sodium (Porcine) (Heparin -)  5,000 unit SQ BID ADAM


   Last Admin: 10/23/17 09:00 Dose:  5,000 unit


CEFTRIAXONE 1 G/50 ML PREMIX (Ceftriaxone 1 Gm-D5w Bag)  50 mls @ 100 mls/hr 

IVPB DAILY [facility]


   Stop: 10/24/17 10:30


   Last Admin: 10/23/17 10:21 Dose:  100 mls/hr


Methylprednisolone Sodium Succinate (Solu-Medrol -)  40 mg IVPUSH Q6H-IV ADAM


   Last Admin: 10/23/17 14:54 Dose:  40 mg


Montelukast Sodium (Singulair -)  10 mg PO HS ADAM


   Last Admin: 10/22/17 21:25 Dose:  10 mg


Tiotropium Bromide (Spiriva -)  1 puff IH DAILY [facility]


   Last Admin: 10/23/17 09:09 Dose:  1 puff











- Objective


Vital Signs: 


 Vital Signs











Temperature  97.8 F   10/23/17 15:02


 


Pulse Rate  68   10/23/17 15:02


 


Respiratory Rate  20   10/23/17 15:02


 


Blood Pressure  148/68   10/23/17 15:02


 


O2 Sat by Pulse Oximetry (%)  99   10/23/17 14:00











Constitutional: Yes: No Distress


Neck: Yes: Supple


Cardiovascular: Yes: Regular Rate and Rhythm, S1, S2.  No: JVD, Murmur


Respiratory: Yes: Wheezes (b/l exp wheeze)


Gastrointestinal: Yes: Soft


Edema: No


Labs: 


 CBC, BMP





 10/23/17 06:25 





 10/23/17 06:25 











Problem List





- Problems


(1) Asthma exacerbation


Code(s): J45.901 - UNSPECIFIED ASTHMA WITH (ACUTE) EXACERBATION   








Assessment/Plan


This is a 67 year old female with a PMH of asthma. She was complaining of GRIMALDO 

and chest pain in 8/17 and had a nuclear stress test and an echocardiogram at 

[facility], both she was told were normal. She presents now with an asthma 

exacerbation and noted to have tachycardia with MAT   





EKG 10/21/17 NSR at 82 BPM with normal intervals, normal axis and NSSTTW 

lateral changes.





-Continue to treat as per pulmonary.  Improving from symptom standpoint but 

still with exp wheeze


Continue to monitor on telemetry


Please obtain results of Stress/echo/and ekg from outside doctor in August.

## 2017-10-24 RX ADMIN — HEPARIN SODIUM SCH UNIT: 5000 INJECTION, SOLUTION INTRAVENOUS; SUBCUTANEOUS at 21:19

## 2017-10-24 RX ADMIN — TIOTROPIUM BROMIDE SCH PUFF: 18 CAPSULE ORAL; RESPIRATORY (INHALATION) at 09:51

## 2017-10-24 RX ADMIN — METHYLPREDNISOLONE SODIUM SUCCINATE SCH MG: 40 INJECTION, POWDER, FOR SOLUTION INTRAMUSCULAR; INTRAVENOUS at 03:15

## 2017-10-24 RX ADMIN — INSULIN ASPART SCH UNITS: 100 INJECTION, SOLUTION INTRAVENOUS; SUBCUTANEOUS at 17:51

## 2017-10-24 RX ADMIN — METHYLPREDNISOLONE SODIUM SUCCINATE SCH MG: 40 INJECTION, POWDER, FOR SOLUTION INTRAMUSCULAR; INTRAVENOUS at 09:51

## 2017-10-24 RX ADMIN — INSULIN ASPART SCH UNITS: 100 INJECTION, SOLUTION INTRAVENOUS; SUBCUTANEOUS at 11:33

## 2017-10-24 RX ADMIN — IPRATROPIUM BROMIDE AND ALBUTEROL SULFATE PRN AMP: .5; 3 SOLUTION RESPIRATORY (INHALATION) at 14:28

## 2017-10-24 RX ADMIN — HEPARIN SODIUM SCH UNIT: 5000 INJECTION, SOLUTION INTRAVENOUS; SUBCUTANEOUS at 09:51

## 2017-10-24 RX ADMIN — MONTELUKAST SODIUM SCH MG: 10 TABLET, COATED ORAL at 21:20

## 2017-10-24 RX ADMIN — BUDESONIDE AND FORMOTEROL FUMARATE DIHYDRATE SCH PUFF: 160; 4.5 AEROSOL RESPIRATORY (INHALATION) at 09:51

## 2017-10-24 RX ADMIN — METHYLPREDNISOLONE SODIUM SUCCINATE SCH MG: 40 INJECTION, POWDER, FOR SOLUTION INTRAMUSCULAR; INTRAVENOUS at 14:42

## 2017-10-24 RX ADMIN — METHYLPREDNISOLONE SODIUM SUCCINATE SCH MG: 40 INJECTION, POWDER, FOR SOLUTION INTRAMUSCULAR; INTRAVENOUS at 21:16

## 2017-10-24 RX ADMIN — CEFTRIAXONE SCH MLS/HR: 1 INJECTION, SOLUTION INTRAVENOUS at 09:51

## 2017-10-24 RX ADMIN — INSULIN ASPART SCH UNITS: 100 INJECTION, SOLUTION INTRAVENOUS; SUBCUTANEOUS at 21:19

## 2017-10-24 RX ADMIN — BUDESONIDE AND FORMOTEROL FUMARATE DIHYDRATE SCH PUFF: 160; 4.5 AEROSOL RESPIRATORY (INHALATION) at 21:21

## 2017-10-24 RX ADMIN — INSULIN ASPART SCH UNITS: 100 INJECTION, SOLUTION INTRAVENOUS; SUBCUTANEOUS at 06:13

## 2017-10-24 NOTE — PN
Progress Note, Physician


Chief Complaint: 


Still wheezy but improving


Tele: sinus with no arrhythmic events


History of Present Illness: 


This is a 67 year old female with a PMH of asthma. She was complaining of GRIMALDO 

and chest pain in 8/17 and had a nuclear stress test and an echocardiogram at 

[facility], both she was told were normal. She presents now with an asthma 

exacerbation and is noted to have tachycardia on the telem monitor. In 

reviewing telemtry, it appears to me to be sinus tachycardia with periods of 

MAT. 





EKG 10/21/17 NSR at 82 BPM with normal intervals, normal axis and NSSTTW 

changes.





- Current Medication List


Current Medications: 


Active Medications





Acetaminophen (Tylenol -)  650 mg PO Q6H PRN


   PRN Reason: FEVER OR PAIN


Albuterol/Ipratropium (Duoneb -)  1 amp NEB Q4H PRN


   PRN Reason: SHORTNESS OF BREATH


   Last Admin: 10/23/17 22:43 Dose:  1 amp


Budesonide/Formoterol Fumarate (Symbicort 160/4.5mcg -)  2 puff IH BID ADAM


   Last Admin: 10/24/17 09:51 Dose:  2 puff


Guaifenesin (Robitussin -)  10 ml PO Q6H PRN


   Last Admin: 10/23/17 02:44 Dose:  10 ml


Heparin Sodium (Porcine) (Heparin -)  5,000 unit SQ BID ADAM


   Last Admin: 10/24/17 09:51 Dose:  5,000 unit


Insulin Aspart (Novolog Vial Sliding Scale -)  1 vial SQ ACHS ADAM


   PRN Reason: Protocol


   Last Admin: 10/24/17 11:33 Dose:  8 units


Methylprednisolone Sodium Succinate (Solu-Medrol -)  40 mg IVPUSH Q6H-IV ADAM


   Last Admin: 10/24/17 09:51 Dose:  40 mg


Montelukast Sodium (Singulair -)  10 mg PO HS ADAM


   Last Admin: 10/23/17 21:44 Dose:  10 mg


Tiotropium Bromide (Spiriva -)  1 puff IH DAILY [facility]


   Last Admin: 10/24/17 09:51 Dose:  1 puff











- Objective


Vital Signs: 


 Vital Signs











Temperature  97.8 F   10/24/17 05:52


 


Pulse Rate  54 L  10/24/17 11:51


 


Respiratory Rate  20   10/24/17 05:52


 


Blood Pressure  137/58   10/24/17 05:52


 


O2 Sat by Pulse Oximetry (%)  98   10/24/17 11:51











Neck: Yes: Supple


Cardiovascular: Yes: Regular Rate and Rhythm, S1, S2.  No: JVD, Murmur


Respiratory: Yes: Wheezes (diffuse exp and insp wheeze)


Gastrointestinal: Yes: Normal Bowel Sounds, Soft


Edema: No





Problem List





- Problems


(1) Asthma exacerbation


Code(s): J45.901 - UNSPECIFIED ASTHMA WITH (ACUTE) EXACERBATION   Qualifiers: 


     Asthma severity: moderate








Assessment/Plan


This is a 67 year old female with a PMH of asthma. She was complaining of GRIMALDO 

and chest pain in 8/17 and had a nuclear stress test and an echocardiogram at 

[facility], both she was told were normal. She presents now with an asthma 

exacerbation and noted to have tachycardia with MAT   





EKG 10/21/17 NSR at 82 BPM with normal intervals, normal axis and NSSTTW 

lateral changes.





-Continue to treat as per pulmonary.  Improving from symptom standpoint but 

still with significant wheeze on exam


No arrhythmias on telemetry monitor


Please obtain results of Stress/echo/and ekg from outside doctor in August.





No further cardiac testing at this time





Will sign off

## 2017-10-24 NOTE — PN
Progress Note, Physician


History of Present Illness: 


pulmonary





alert,still congested,+cough





- Current Medication List


Current Medications: 


Active Medications





Acetaminophen (Tylenol -)  650 mg PO Q6H PRN


   PRN Reason: FEVER OR PAIN


Albuterol/Ipratropium (Duoneb -)  1 amp NEB Q4H PRN


   PRN Reason: SHORTNESS OF BREATH


   Last Admin: 10/23/17 22:43 Dose:  1 amp


Budesonide/Formoterol Fumarate (Symbicort 160/4.5mcg -)  2 puff IH BID ADAM


   Last Admin: 10/24/17 09:51 Dose:  2 puff


Guaifenesin (Robitussin -)  10 ml PO Q6H PRN


   Last Admin: 10/23/17 02:44 Dose:  10 ml


Heparin Sodium (Porcine) (Heparin -)  5,000 unit SQ BID ADAM


   Last Admin: 10/24/17 09:51 Dose:  5,000 unit


Insulin Aspart (Novolog Vial Sliding Scale -)  1 vial SQ ACHS ADAM


   PRN Reason: Protocol


   Last Admin: 10/24/17 06:13 Dose:  4 units


Methylprednisolone Sodium Succinate (Solu-Medrol -)  40 mg IVPUSH Q6H-IV ADAM


   Last Admin: 10/24/17 09:51 Dose:  40 mg


Montelukast Sodium (Singulair -)  10 mg PO HS ADAM


   Last Admin: 10/23/17 21:44 Dose:  10 mg


Tiotropium Bromide (Spiriva -)  1 puff IH DAILY [facility]


   Last Admin: 10/24/17 09:51 Dose:  1 puff











- Objective


Vital Signs: 


 Vital Signs











Temperature  97.8 F   10/24/17 05:52


 


Pulse Rate  54 L  10/24/17 05:52


 


Respiratory Rate  20   10/24/17 05:52


 


Blood Pressure  137/58   10/24/17 05:52


 


O2 Sat by Pulse Oximetry (%)  99   10/23/17 22:52











Constitutional: Yes: Well Nourished, Calm


Eyes: Yes: WNL


HENT: Yes: WNL


Neck: Yes: WNL


Cardiovascular: Yes: Regular Rate and Rhythm, S1, S2


Respiratory: Yes: Rhonchi (scattered shannan rhonchi,wheezes)


Gastrointestinal: Yes: Normal Bowel Sounds, Soft


Extremities: Yes: WNL


Edema: No





Assessment/Plan


Problem List





- Problems


(1) Asthma exacerbation


Code(s): J45.901 - UNSPECIFIED ASTHMA WITH (ACUTE) EXACERBATION   





(2) Elevated blood sugar level


Code(s): R73.9 - HYPERGLYCEMIA, UNSPECIFIED





(3) Diabetes type 2, controlled


Code(s): E11.9 - TYPE 2 DIABETES MELLITUS WITHOUT COMPLICATIONS   








Assessment/Plan


ACUTE ASTHMATIC BRONCHITIS/NO RADIOGRAPHIC EVIDENCE OF PNEUMONIA


HYPERGLYCEMIA








IV STEROIDS same dose


ANTIBIOTICS


ICS


LABA


NEBS PRN


DAILY PEAK FLOW


CHECK IGE LEVEL/RAST OUTPATIENT


PFTS OUTPATIENT





DR MAI

## 2017-10-24 NOTE — EKG
Test Reason : 

Blood Pressure : ***/*** mmHG

Vent. Rate : 159 BPM     Atrial Rate : 159 BPM

   P-R Int : 120 ms          QRS Dur : 082 ms

    QT Int : 242 ms       P-R-T Axes : 000 -01 136 degrees

   QTc Int : 393 ms

 

RHYTHM  APPEARS TO BE A  REENTRANT SUPRAVENTRICULAR TACHYCARDIA WITH

VA  CONDUCTION, CANNOT EXCLUDE ATRIAL FLUTTER WITH 2:1  CONDUCTION



ABNORMAL ECG

WHEN COMPARED WITH ECG OF 21-OCT-2017 21:42,

RHYTHM CHANGED AS ABOVE

ST NOW DEPRESSED IN ANTEROLATERAL LEADS

RECOMMEND FOLLOW UP EKG

EKG OF 10-21-17 SUBMITTED ON 10-24-17

Confirmed by DINH MIRANDA MD ([id number]) on 10/24/2017 7:09:05 PM

 

Referred By:             Confirmed By:DINH MIRANDA MD

## 2017-10-24 NOTE — PN
Progress Note, Physician


Chief Complaint: 


AWAKE ALERT ON BIPAP





- Current Medication List


Current Medications: 


Active Medications





Acetaminophen (Tylenol -)  650 mg PO Q6H PRN


   PRN Reason: FEVER OR PAIN


Albuterol/Ipratropium (Duoneb -)  1 amp NEB Q4H PRN


   PRN Reason: SHORTNESS OF BREATH


   Last Admin: 10/24/17 14:28 Dose:  1 amp


Budesonide/Formoterol Fumarate (Symbicort 160/4.5mcg -)  2 puff IH BID ADAM


   Last Admin: 10/24/17 09:51 Dose:  2 puff


Guaifenesin (Robitussin -)  10 ml PO Q6H PRN


   Last Admin: 10/23/17 02:44 Dose:  10 ml


Heparin Sodium (Porcine) (Heparin -)  5,000 unit SQ BID ADAM


   Last Admin: 10/24/17 09:51 Dose:  5,000 unit


Insulin Aspart (Novolog Vial Sliding Scale -)  1 vial SQ ACHS ADAM


   PRN Reason: Protocol


   Last Admin: 10/24/17 11:33 Dose:  8 units


Methylprednisolone Sodium Succinate (Solu-Medrol -)  40 mg IVPUSH Q6H-IV ADAM


   Last Admin: 10/24/17 14:42 Dose:  40 mg


Montelukast Sodium (Singulair -)  10 mg PO HS ADAM


   Last Admin: 10/23/17 21:44 Dose:  10 mg


Tiotropium Bromide (Spiriva -)  1 puff IH DAILY [facility]


   Last Admin: 10/24/17 09:51 Dose:  1 puff











- Objective


Vital Signs: 


 Vital Signs











Temperature  97.8 F   10/24/17 05:52


 


Pulse Rate  54 L  10/24/17 11:51


 


Respiratory Rate  20   10/24/17 05:52


 


Blood Pressure  137/58   10/24/17 05:52


 


O2 Sat by Pulse Oximetry (%)  98   10/24/17 14:43











Constitutional: Yes: Mild Distress


Eyes: Yes: WNL


HENT: Yes: WNL


Neck: Yes: WNL


Cardiovascular: Yes: WNL


Respiratory: Yes: On BiPap, Rhonchi


Gastrointestinal: Yes: WNL


Genitourinary: Yes: WNL


Musculoskeletal: Yes: WNL


Extremities: Yes: WNL


Edema: No


Peripheral Pulses WNL: Yes


Integumentary: Yes: WNL


Wound/Incision: Yes: Clean/Dry


Neurological: Yes: WNL


...Motor Strength: WNL


Psychiatric: Yes: WNL





Problem List





- Problems


(1) Asthma exacerbation


Code(s): J45.901 - UNSPECIFIED ASTHMA WITH (ACUTE) EXACERBATION   Qualifiers: 


     Asthma severity: moderate





(2) Tachycardia


Code(s): R00.0 - TACHYCARDIA, UNSPECIFIED





(3) Diabetes type 2, controlled


Code(s): E11.9 - TYPE 2 DIABETES MELLITUS WITHOUT COMPLICATIONS   Qualifiers: 


     Diabetes mellitus complication status: without complication








Assessment/Plan


IV STEROIDS


BIPAP AND RESP SUPPORT


LABS REVIEWED


SSI


ADA

## 2017-10-24 NOTE — EKG
Test Reason : 

Blood Pressure : ***/*** mmHG

Vent. Rate : 114 BPM     Atrial Rate : 114 BPM

   P-R Int : 130 ms          QRS Dur : 082 ms

    QT Int : 326 ms       P-R-T Axes : 102 000 073 degrees

   QTc Int : 449 ms

 

ATRIAL RHYTHM  AT TIMES APPEARS TO BE SINUS WITH SHORT NH DURATION

SUGGESTING PREXCITATION AND  WANDERING ATRIAL RHYTHM WITH  CHANGING

ATRIAL MORPHOLOGY

NOTE ELECTRICAL ALTERNANS

NONSPECIFIC T WAVE ABNORMALITY

ABNORMAL ECG

WHEN COMPARED WITH ECG OF 21-OCT-2017 22:06,

ST NO LONGER DEPRESSED IN LATERAL LEADS

NONSPECIFIC T WAVE ABNORMALITY NO LONGER EVIDENT IN INFERIOR LEADS

FOLLOWUP TRACING IS RECOMMENDED

Confirmed by DINH MIRANDA MD ([id number]) on 10/24/2017 7:23:30 PM

 

Referred By:             Confirmed By:DINH MIRANDA MD

## 2017-10-25 RX ADMIN — INSULIN ASPART SCH UNITS: 100 INJECTION, SOLUTION INTRAVENOUS; SUBCUTANEOUS at 16:43

## 2017-10-25 RX ADMIN — METHYLPREDNISOLONE SODIUM SUCCINATE SCH MG: 40 INJECTION, POWDER, FOR SOLUTION INTRAMUSCULAR; INTRAVENOUS at 03:27

## 2017-10-25 RX ADMIN — IPRATROPIUM BROMIDE AND ALBUTEROL SULFATE PRN AMP: .5; 3 SOLUTION RESPIRATORY (INHALATION) at 11:42

## 2017-10-25 RX ADMIN — HEPARIN SODIUM SCH UNIT: 5000 INJECTION, SOLUTION INTRAVENOUS; SUBCUTANEOUS at 22:35

## 2017-10-25 RX ADMIN — INSULIN ASPART SCH UNITS: 100 INJECTION, SOLUTION INTRAVENOUS; SUBCUTANEOUS at 06:02

## 2017-10-25 RX ADMIN — HEPARIN SODIUM SCH UNIT: 5000 INJECTION, SOLUTION INTRAVENOUS; SUBCUTANEOUS at 09:29

## 2017-10-25 RX ADMIN — BUDESONIDE AND FORMOTEROL FUMARATE DIHYDRATE SCH PUFF: 160; 4.5 AEROSOL RESPIRATORY (INHALATION) at 23:00

## 2017-10-25 RX ADMIN — METHYLPREDNISOLONE SODIUM SUCCINATE SCH MG: 40 INJECTION, POWDER, FOR SOLUTION INTRAMUSCULAR; INTRAVENOUS at 22:35

## 2017-10-25 RX ADMIN — INSULIN ASPART SCH UNITS: 100 INJECTION, SOLUTION INTRAVENOUS; SUBCUTANEOUS at 22:35

## 2017-10-25 RX ADMIN — IPRATROPIUM BROMIDE AND ALBUTEROL SULFATE PRN AMP: .5; 3 SOLUTION RESPIRATORY (INHALATION) at 00:52

## 2017-10-25 RX ADMIN — METHYLPREDNISOLONE SODIUM SUCCINATE SCH MG: 40 INJECTION, POWDER, FOR SOLUTION INTRAMUSCULAR; INTRAVENOUS at 16:43

## 2017-10-25 RX ADMIN — TIOTROPIUM BROMIDE SCH PUFF: 18 CAPSULE ORAL; RESPIRATORY (INHALATION) at 09:29

## 2017-10-25 RX ADMIN — INSULIN ASPART SCH UNITS: 100 INJECTION, SOLUTION INTRAVENOUS; SUBCUTANEOUS at 11:54

## 2017-10-25 RX ADMIN — BUDESONIDE AND FORMOTEROL FUMARATE DIHYDRATE SCH PUFF: 160; 4.5 AEROSOL RESPIRATORY (INHALATION) at 09:29

## 2017-10-25 RX ADMIN — MONTELUKAST SODIUM SCH MG: 10 TABLET, COATED ORAL at 22:35

## 2017-10-25 RX ADMIN — METHYLPREDNISOLONE SODIUM SUCCINATE SCH MG: 40 INJECTION, POWDER, FOR SOLUTION INTRAMUSCULAR; INTRAVENOUS at 09:29

## 2017-10-25 NOTE — PN
Progress Note, Physician


Chief Complaint: 


SITTING UP IN BED


ON 02 2L


STILL COUGHING





- Current Medication List


Current Medications: 


Active Medications





Acetaminophen (Tylenol -)  650 mg PO Q6H PRN


   PRN Reason: FEVER OR PAIN


Albuterol/Ipratropium (Duoneb -)  1 amp NEB Q4H PRN


   PRN Reason: SHORTNESS OF BREATH


   Last Admin: 10/25/17 11:42 Dose:  1 amp


Budesonide/Formoterol Fumarate (Symbicort 160/4.5mcg -)  2 puff IH BID ADAM


   Last Admin: 10/25/17 09:29 Dose:  2 puff


Guaifenesin (Robitussin -)  10 ml PO Q6H PRN


   Last Admin: 10/23/17 02:44 Dose:  10 ml


Heparin Sodium (Porcine) (Heparin -)  5,000 unit SQ BID ADAM


   Last Admin: 10/25/17 09:29 Dose:  5,000 unit


Insulin Aspart (Novolog Vial Sliding Scale -)  1 vial SQ ACHS ADAM


   PRN Reason: Protocol


   Last Admin: 10/25/17 16:43 Dose:  6 units


Methylprednisolone Sodium Succinate (Solu-Medrol -)  40 mg IVPUSH Q6H-IV ADAM


   Last Admin: 10/25/17 16:43 Dose:  40 mg


Montelukast Sodium (Singulair -)  10 mg PO HS [facility]


   Last Admin: 10/24/17 21:20 Dose:  10 mg


Tiotropium Bromide (Spiriva -)  1 puff IH DAILY [facility]


   Last Admin: 10/25/17 09:29 Dose:  1 puff











- Objective


Vital Signs: 


 Vital Signs











Temperature  97.7 F   10/25/17 18:00


 


Pulse Rate  58 L  10/25/17 18:00


 


Respiratory Rate  20   10/25/17 18:00


 


Blood Pressure  159/56   10/25/17 18:00


 


O2 Sat by Pulse Oximetry (%)  100   10/25/17 11:43











Constitutional: Yes: Mild Distress


Eyes: Yes: WNL


HENT: Yes: WNL


Neck: Yes: WNL


Cardiovascular: Yes: WNL


Respiratory: Yes: Cough, On Nasal O2, Rhonchi, SOB, Wheezes


Gastrointestinal: Yes: WNL


Genitourinary: Yes: WNL


Musculoskeletal: Yes: WNL


Extremities: Yes: WNL


Edema: No


Peripheral Pulses WNL: Yes


Integumentary: Yes: WNL


Wound/Incision: Yes: Clean/Dry


Neurological: Yes: WNL


...Motor Strength: WNL


Psychiatric: Yes: WNL





Problem List





- Problems


(1) Asthma exacerbation


Code(s): J45.901 - UNSPECIFIED ASTHMA WITH (ACUTE) EXACERBATION   Qualifiers: 


     Asthma severity: moderate





(2) Tachycardia


Code(s): R00.0 - TACHYCARDIA, UNSPECIFIED





(3) Diabetes type 2, controlled


Code(s): E11.9 - TYPE 2 DIABETES MELLITUS WITHOUT COMPLICATIONS   Qualifiers: 


     Diabetes mellitus complication status: without complication





(4) Respiratory distress


Code(s): R06.00 - DYSPNEA, UNSPECIFIED








Assessment/Plan


CXR REVIEWED


ORDER CT CHEST RULE OUT OTHER MANIFESTATION


NEBS/STEROIDS/02 SUPPORT


OOB TO CHAIR


DVT PROPHYLAXIS


PULMONARY FOLLOW UP


SSI

## 2017-10-25 NOTE — PN
Progress Note, Physician


History of Present Illness: 


pulmonary








alert,slowly improving,+cough,dyspnea with min exertion





- Current Medication List


Current Medications: 


Active Medications





Acetaminophen (Tylenol -)  650 mg PO Q6H PRN


   PRN Reason: FEVER OR PAIN


Albuterol/Ipratropium (Duoneb -)  1 amp NEB Q4H PRN


   PRN Reason: SHORTNESS OF BREATH


   Last Admin: 10/25/17 00:52 Dose:  1 amp


Budesonide/Formoterol Fumarate (Symbicort 160/4.5mcg -)  2 puff IH BID ADAM


   Last Admin: 10/25/17 09:29 Dose:  2 puff


Guaifenesin (Robitussin -)  10 ml PO Q6H PRN


   Last Admin: 10/23/17 02:44 Dose:  10 ml


Heparin Sodium (Porcine) (Heparin -)  5,000 unit SQ BID AD[facility]


   Last Admin: 10/25/17 09:29 Dose:  5,000 unit


Insulin Aspart (Novolog Vial Sliding Scale -)  1 vial SQ ACHS ADAM


   PRN Reason: Protocol


   Last Admin: 10/25/17 06:02 Dose:  4 units


Methylprednisolone Sodium Succinate (Solu-Medrol -)  40 mg IVPUSH Q6H-IV [facility]


   Last Admin: 10/25/17 09:29 Dose:  40 mg


Montelukast Sodium (Singulair -)  10 mg PO HS [facility]


   Last Admin: 10/24/17 21:20 Dose:  10 mg


Tiotropium Bromide (Spiriva -)  1 puff IH DAILY [facility]


   Last Admin: 10/25/17 09:29 Dose:  1 puff











- Objective


Vital Signs: 


 Vital Signs











Temperature  97.9 F   10/25/17 10:00


 


Pulse Rate  70   10/25/17 10:00


 


Respiratory Rate  22   10/25/17 10:00


 


Blood Pressure  126/63   10/25/17 10:00


 


O2 Sat by Pulse Oximetry (%)  98   10/25/17 10:00











Constitutional: Yes: Well Nourished, Calm


Eyes: Yes: WNL


HENT: Yes: WNL


Neck: Yes: WNL


Cardiovascular: Yes: Regular Rate and Rhythm, S1, S2


Respiratory: Yes: Rhonchi, Wheezes (shannan wheezes and rhonchi)


Gastrointestinal: Yes: Normal Bowel Sounds, Soft


Extremities: Yes: WNL


Edema: No





Assessment/Plan


Problem List





- Problems


(1) Asthma exacerbation


Code(s): J45.901 - UNSPECIFIED ASTHMA WITH (ACUTE) EXACERBATION   





(2) Elevated blood sugar level


Code(s): R73.9 - HYPERGLYCEMIA, UNSPECIFIED





(3) Diabetes type 2, controlled


Code(s): E11.9 - TYPE 2 DIABETES MELLITUS WITHOUT COMPLICATIONS   








Assessment/Plan


ACUTE ASTHMATIC BRONCHITIS/NO RADIOGRAPHIC EVIDENCE OF PNEUMONIA


HYPERGLYCEMIA








IV STEROIDS same dose


ANTIBIOTICS


ICS


LABA


NEBS PRN


DAILY PEAK FLOW


CHECK IGE LEVEL/RAST OUTPATIENT


PFTS OUTPATIENT





DR MAI

## 2017-10-26 LAB
ALBUMIN SERPL-MCNC: 2.9 G/DL (ref 3.4–5)
ALP SERPL-CCNC: 68 U/L (ref 45–117)
ALT SERPL-CCNC: 31 U/L (ref 12–78)
ANION GAP SERPL CALC-SCNC: 10 MMOL/L (ref 8–16)
AST SERPL-CCNC: 6 U/L (ref 15–37)
BILIRUB SERPL-MCNC: 0.6 MG/DL (ref 0.2–1)
CALCIUM SERPL-MCNC: 8.2 MG/DL (ref 8.5–10.1)
CO2 SERPL-SCNC: 29 MMOL/L (ref 21–32)
CREAT SERPL-MCNC: 0.4 MG/DL (ref 0.55–1.02)
DEPRECATED RDW RBC AUTO: 12.3 % (ref 11.6–15.6)
GLUCOSE SERPL-MCNC: 210 MG/DL (ref 74–106)
MCH RBC QN AUTO: 30.9 PG (ref 25.7–33.7)
MCHC RBC AUTO-ENTMCNC: 33.5 G/DL (ref 32–36)
MCV RBC: 92.2 FL (ref 80–96)
PLATELET # BLD AUTO: 222 K/MM3 (ref 134–434)
PMV BLD: 7.7 FL (ref 7.5–11.1)
PROT SERPL-MCNC: 6 G/DL (ref 6.4–8.2)
WBC # BLD AUTO: 13.1 K/MM3 (ref 4–10)

## 2017-10-26 PROCEDURE — 5A09457 ASSISTANCE WITH RESPIRATORY VENTILATION, 24-96 CONSECUTIVE HOURS, CONTINUOUS POSITIVE AIRWAY PRESSURE: ICD-10-PCS | Performed by: INTERNAL MEDICINE

## 2017-10-26 RX ADMIN — METHYLPREDNISOLONE SODIUM SUCCINATE SCH MG: 40 INJECTION, POWDER, FOR SOLUTION INTRAMUSCULAR; INTRAVENOUS at 22:11

## 2017-10-26 RX ADMIN — INSULIN ASPART SCH UNITS: 100 INJECTION, SOLUTION INTRAVENOUS; SUBCUTANEOUS at 06:46

## 2017-10-26 RX ADMIN — METHYLPREDNISOLONE SODIUM SUCCINATE SCH MG: 40 INJECTION, POWDER, FOR SOLUTION INTRAMUSCULAR; INTRAVENOUS at 10:56

## 2017-10-26 RX ADMIN — IPRATROPIUM BROMIDE AND ALBUTEROL SULFATE PRN AMP: .5; 3 SOLUTION RESPIRATORY (INHALATION) at 21:52

## 2017-10-26 RX ADMIN — IPRATROPIUM BROMIDE AND ALBUTEROL SULFATE PRN AMP: .5; 3 SOLUTION RESPIRATORY (INHALATION) at 11:30

## 2017-10-26 RX ADMIN — METHYLPREDNISOLONE SODIUM SUCCINATE SCH: 40 INJECTION, POWDER, FOR SOLUTION INTRAMUSCULAR; INTRAVENOUS at 12:00

## 2017-10-26 RX ADMIN — HEPARIN SODIUM SCH UNIT: 5000 INJECTION, SOLUTION INTRAVENOUS; SUBCUTANEOUS at 10:56

## 2017-10-26 RX ADMIN — TIOTROPIUM BROMIDE SCH PUFF: 18 CAPSULE ORAL; RESPIRATORY (INHALATION) at 10:57

## 2017-10-26 RX ADMIN — MONTELUKAST SODIUM SCH MG: 10 TABLET, COATED ORAL at 22:12

## 2017-10-26 RX ADMIN — BUDESONIDE AND FORMOTEROL FUMARATE DIHYDRATE SCH PUFF: 160; 4.5 AEROSOL RESPIRATORY (INHALATION) at 10:58

## 2017-10-26 RX ADMIN — METHYLPREDNISOLONE SODIUM SUCCINATE SCH MG: 40 INJECTION, POWDER, FOR SOLUTION INTRAMUSCULAR; INTRAVENOUS at 03:05

## 2017-10-26 RX ADMIN — INSULIN ASPART SCH UNITS: 100 INJECTION, SOLUTION INTRAVENOUS; SUBCUTANEOUS at 17:30

## 2017-10-26 RX ADMIN — BUDESONIDE AND FORMOTEROL FUMARATE DIHYDRATE SCH PUFF: 160; 4.5 AEROSOL RESPIRATORY (INHALATION) at 22:11

## 2017-10-26 RX ADMIN — HEPARIN SODIUM SCH UNIT: 5000 INJECTION, SOLUTION INTRAVENOUS; SUBCUTANEOUS at 22:14

## 2017-10-26 RX ADMIN — INSULIN ASPART SCH UNITS: 100 INJECTION, SOLUTION INTRAVENOUS; SUBCUTANEOUS at 12:20

## 2017-10-26 RX ADMIN — INSULIN ASPART SCH UNITS: 100 INJECTION, SOLUTION INTRAVENOUS; SUBCUTANEOUS at 22:12

## 2017-10-26 NOTE — PN
Progress Note, Physician


History of Present Illness: 


PULMONARY





ALERT,FEELING BETTER,LESS CONGESTED.





- Current Medication List


Current Medications: 


Active Medications





Acetaminophen (Tylenol -)  650 mg PO Q6H PRN


   PRN Reason: FEVER OR PAIN


Albuterol/Ipratropium (Duoneb -)  1 amp NEB Q4H PRN


   PRN Reason: SHORTNESS OF BREATH


   Last Admin: 10/25/17 11:42 Dose:  1 amp


Budesonide/Formoterol Fumarate (Symbicort 160/4.5mcg -)  2 puff IH BID [facility]


   Last Admin: 10/26/17 10:58 Dose:  2 puff


Guaifenesin (Robitussin -)  10 ml PO Q6H PRN


   Last Admin: 10/23/17 02:44 Dose:  10 ml


Heparin Sodium (Porcine) (Heparin -)  5,000 unit SQ BID [facility]


   Last Admin: 10/26/17 10:56 Dose:  5,000 unit


Insulin Aspart (Novolog Vial Sliding Scale -)  1 vial SQ ACHS AD[facility]


   PRN Reason: Protocol


   Last Admin: 10/26/17 06:46 Dose:  2 units


Methylprednisolone Sodium Succinate (Solu-Medrol -)  40 mg IVPUSH Q6H-IV [facility]


   Last Admin: 10/26/17 10:56 Dose:  40 mg


Montelukast Sodium (Singulair -)  10 mg PO HS [facility]


   Last Admin: 10/25/17 22:35 Dose:  10 mg


Tiotropium Bromide (Spiriva -)  1 puff IH DAILY [facility]


   Last Admin: 10/26/17 10:57 Dose:  1 puff











- Objective


Vital Signs: 


 Vital Signs











Temperature  97.4 F L  10/26/17 06:00


 


Pulse Rate  56 L  10/26/17 06:00


 


Respiratory Rate  18   10/26/17 06:00


 


Blood Pressure  131/58   10/26/17 06:00


 


O2 Sat by Pulse Oximetry (%)  97   10/25/17 22:00











Constitutional: Yes: Well Nourished, Calm


Eyes: Yes: WNL


HENT: Yes: WNL


Neck: Yes: WNL


Cardiovascular: Yes: Regular Rate and Rhythm, S1, S2


Respiratory: Yes: Wheezes (LESS WHEEZES BILATERALLY)


Gastrointestinal: Yes: Normal Bowel Sounds, Soft


Extremities: Yes: WNL


Edema: No


Labs: 


 CBC, BMP





 10/26/17 06:15 





 10/26/17 06:15 











- ....Imaging


Cat Scan: Report Reviewed, Image Reviewed (PARTIAL ATELECTASIS RML)





Assessment/Plan


Problem List





- Problems


(1) Asthma exacerbation


Code(s): J45.901 - UNSPECIFIED ASTHMA WITH (ACUTE) EXACERBATION   





(2) Elevated blood sugar level


Code(s): R73.9 - HYPERGLYCEMIA, UNSPECIFIED





(3) Diabetes type 2, controlled


Code(s): E11.9 - TYPE 2 DIABETES MELLITUS WITHOUT COMPLICATIONS   








Assessment/Plan


ACUTE ASTHMATIC BRONCHITIS/NO RADIOGRAPHIC EVIDENCE OF PNEUMONIA


HYPERGLYCEMIA








TAPER STEROIDS


ICS


LABA


NEBS PRN


DAILY PEAK FLOW


CHECK IGE LEVEL/RAST OUTPATIENT


PFTS OUTPATIENT





DR MAI

## 2017-10-26 NOTE — PN
Progress Note, Physician


Chief Complaint: 


FEELING BETTER








- Current Medication List


Current Medications: 


Active Medications





Acetaminophen (Tylenol -)  650 mg PO Q6H PRN


   PRN Reason: FEVER OR PAIN


Albuterol/Ipratropium (Duoneb -)  1 amp NEB Q4H PRN


   PRN Reason: SHORTNESS OF BREATH


   Last Admin: 10/26/17 11:30 Dose:  1 amp


Budesonide/Formoterol Fumarate (Symbicort 160/4.5mcg -)  2 puff IH BID [facility]


   Last Admin: 10/26/17 10:58 Dose:  2 puff


Guaifenesin (Robitussin -)  10 ml PO Q6H PRN


   Last Admin: 10/23/17 02:44 Dose:  10 ml


Heparin Sodium (Porcine) (Heparin -)  5,000 unit SQ BID [facility]


   Last Admin: 10/26/17 10:56 Dose:  5,000 unit


Insulin Aspart (Novolog Vial Sliding Scale -)  1 vial SQ ACHS AD[facility]


   PRN Reason: Protocol


   Last Admin: 10/26/17 12:20 Dose:  6 units


Methylprednisolone Sodium Succinate (Solu-Medrol -)  40 mg IVPUSH BID [facility]


Montelukast Sodium (Singulair -)  10 mg PO HS [facility]


   Last Admin: 10/25/17 22:35 Dose:  10 mg


Tiotropium Bromide (Spiriva -)  1 puff IH DAILY [facility]


   Last Admin: 10/26/17 10:57 Dose:  1 puff











- Objective


Vital Signs: 


 Vital Signs











Temperature  98.5 F   10/26/17 14:20


 


Pulse Rate  71   10/26/17 14:20


 


Respiratory Rate  20   10/26/17 14:20


 


Blood Pressure  135/78   10/26/17 14:20


 


O2 Sat by Pulse Oximetry (%)  94 L  10/26/17 09:00











Constitutional: Yes: Mild Distress


Eyes: Yes: WNL


HENT: Yes: WNL


Neck: Yes: WNL


Cardiovascular: Yes: WNL


Respiratory: Yes: On Nasal O2, Wheezes


Gastrointestinal: Yes: WNL


Genitourinary: Yes: WNL


Musculoskeletal: Yes: WNL


Extremities: Yes: WNL


Peripheral Pulses WNL: Yes


Integumentary: Yes: WNL


Wound/Incision: Yes: Clean/Dry


Neurological: Yes: WNL


...Motor Strength: WNL


Psychiatric: Yes: WNL


Labs: 


 CBC, BMP





 10/26/17 06:15 





 10/26/17 06:15 











Problem List





- Problems


(1) Asthma exacerbation


Code(s): J45.901 - UNSPECIFIED ASTHMA WITH (ACUTE) EXACERBATION   Qualifiers: 


     Asthma severity: moderate





(2) Tachycardia


Code(s): R00.0 - TACHYCARDIA, UNSPECIFIED





(3) Diabetes type 2, controlled


Code(s): E11.9 - TYPE 2 DIABETES MELLITUS WITHOUT COMPLICATIONS   Qualifiers: 


     Diabetes mellitus complication status: without complication





(4) Respiratory distress


Code(s): R06.00 - DYSPNEA, UNSPECIFIED








Assessment/Plan


TAPER STEROIDS


02 SUPPORT


DC PLANNING TOMORROW

## 2017-10-27 VITALS — TEMPERATURE: 98.4 F | HEART RATE: 64 BPM | SYSTOLIC BLOOD PRESSURE: 139 MMHG | DIASTOLIC BLOOD PRESSURE: 89 MMHG

## 2017-10-27 RX ADMIN — INSULIN ASPART SCH: 100 INJECTION, SOLUTION INTRAVENOUS; SUBCUTANEOUS at 06:40

## 2017-10-27 RX ADMIN — IPRATROPIUM BROMIDE AND ALBUTEROL SULFATE PRN AMP: .5; 3 SOLUTION RESPIRATORY (INHALATION) at 11:31

## 2017-10-27 RX ADMIN — HEPARIN SODIUM SCH UNIT: 5000 INJECTION, SOLUTION INTRAVENOUS; SUBCUTANEOUS at 09:26

## 2017-10-27 RX ADMIN — INSULIN ASPART SCH: 100 INJECTION, SOLUTION INTRAVENOUS; SUBCUTANEOUS at 06:38

## 2017-10-27 RX ADMIN — TIOTROPIUM BROMIDE SCH PUFF: 18 CAPSULE ORAL; RESPIRATORY (INHALATION) at 09:24

## 2017-10-27 RX ADMIN — METHYLPREDNISOLONE SODIUM SUCCINATE SCH MG: 40 INJECTION, POWDER, FOR SOLUTION INTRAMUSCULAR; INTRAVENOUS at 09:26

## 2017-10-27 RX ADMIN — BUDESONIDE AND FORMOTEROL FUMARATE DIHYDRATE SCH PUFF: 160; 4.5 AEROSOL RESPIRATORY (INHALATION) at 09:26

## 2017-10-27 NOTE — DS
Physical Examination


Vital Signs: 


 Vital Signs











Temperature  97.7 F   10/27/17 02:00


 


Pulse Rate  54 L  10/27/17 02:00


 


Respiratory Rate  18   10/27/17 02:00


 


Blood Pressure  141/68   10/27/17 02:00


 


O2 Sat by Pulse Oximetry (%)  97   10/26/17 22:00











Findings/Remarks: 


AWAKE ALERT FEELING BETTER WANTS TO GO HOME


Constitutional: Yes: No Distress


Eyes: Yes: WNL


HENT: Yes: WNL


Neck: Yes: WNL


Cardiovascular: Yes: WNL


Respiratory: Yes: WNL


Gastrointestinal: Yes: WNL


Renal/: Yes: WNL


Musculoskeletal: Yes: WNL


Extremities: Yes: WNL


Edema: No


Peripheral Pulses WNL: Yes


Integumentary: Yes: WNL


Wound/Incision: Yes: Clean/Dry


Neurological: Yes: WNL


...Motor Strength: WNL


Psychiatric: Yes: WNL


Labs: 


 CBC, BMP





 10/26/17 06:15 





 10/26/17 06:15 











Discharge Summary


Reason For Visit: EXACERBATION OF ASTHMA,HYPERGLYCEMIA


Current Active Problems





Asthma exacerbation (Acute) 


Elevated blood sugar level (Acute) 


Respiratory distress (Acute) 


Second hand smoke exposure (Acute) 


Tachycardia (Acute) 








Procedures: Principal: CT CHEST


Other Procedures: ADMITTED ACUTE ASTHMA EXACERBATION/BRONCHITIS REQUIRING BIPAP 

FOR RESP DISTRESS, STEROIDS IV AND NEBS, IMPROVED


Condition: Improved





- Instructions


Diet, Activity, Other Instructions: 


ADA


SEE DR MAI IN 1 MONTH


DR EDEN 1-2 WEEKS


Referrals: 


Halima Eden MD [Primary Care Provider] - 


Disposition: HOME





- Home Medications


Comprehensive Discharge Medication List: 


Ambulatory Orders





Albuterol Sulfate Inhaler - 2 puff IN PRN 10/22/17 


Prednisone [Deltasone -] 20 mg PO DAILY 10/22/17

## 2017-12-01 ENCOUNTER — HOSPITAL ENCOUNTER (INPATIENT)
Dept: HOSPITAL 74 - JER | Age: 67
LOS: 4 days | Discharge: HOME | DRG: 202 | End: 2017-12-05
Attending: FAMILY MEDICINE | Admitting: FAMILY MEDICINE
Payer: COMMERCIAL

## 2017-12-01 VITALS — BODY MASS INDEX: 24.1 KG/M2

## 2017-12-01 DIAGNOSIS — Z77.22: ICD-10-CM

## 2017-12-01 DIAGNOSIS — E11.9: ICD-10-CM

## 2017-12-01 DIAGNOSIS — I47.1: ICD-10-CM

## 2017-12-01 DIAGNOSIS — J45.41: Primary | ICD-10-CM

## 2017-12-01 DIAGNOSIS — I10: ICD-10-CM

## 2017-12-01 DIAGNOSIS — J44.9: ICD-10-CM

## 2017-12-01 LAB
ALBUMIN SERPL-MCNC: 3.3 G/DL (ref 3.4–5)
ALP SERPL-CCNC: 79 U/L (ref 45–117)
ALT SERPL-CCNC: 39 U/L (ref 12–78)
ANION GAP SERPL CALC-SCNC: 6 MMOL/L (ref 8–16)
APTT BLD: 29.8 SECONDS (ref 26.9–34.4)
AST SERPL-CCNC: 23 U/L (ref 15–37)
BASOPHIL %.: 1 % (ref 0–2)
BILIRUB SERPL-MCNC: 0.4 MG/DL (ref 0.2–1)
CALCIUM SERPL-MCNC: 7.6 MG/DL (ref 8.5–10.1)
CO2 SERPL-SCNC: 29 MMOL/L (ref 21–32)
CREAT SERPL-MCNC: 0.6 MG/DL (ref 0.55–1.02)
DEPRECATED RDW RBC AUTO: 13.5 % (ref 11.6–15.6)
GLUCOSE SERPL-MCNC: 123 MG/DL (ref 74–106)
INR BLD: 1.12 (ref 0.82–1.09)
MAGNESIUM SERPL-MCNC: 1.8 MG/DL (ref 1.8–2.4)
MCH RBC QN AUTO: 31 PG (ref 25.7–33.7)
MCHC RBC AUTO-ENTMCNC: 33.2 G/DL (ref 32–36)
MCV RBC: 93.2 FL (ref 80–96)
PLATELET # BLD AUTO: 148 K/MM3 (ref 134–434)
PLATELET BLD QL SMEAR: (no result)
PMV BLD: 7.9 FL (ref 7.5–11.1)
PROT SERPL-MCNC: 6.4 G/DL (ref 6.4–8.2)
PT PNL PPP: 12.6 SEC (ref 9.98–11.88)
TOTAL CELLS COUNTED BLD: 100
TROPONIN I SERPL-MCNC: 0.02 NG/ML (ref 0–0.05)
TSH SERPL-ACNC: 1.09 UIU/ML (ref 0.36–3.74)
VARIANT LYMPHS NFR BLD MANUAL: 17 % (ref 0–80)
WBC # BLD AUTO: 5.8 K/MM3 (ref 4–10)

## 2017-12-01 RX ADMIN — IPRATROPIUM BROMIDE AND ALBUTEROL SULFATE SCH AMP: .5; 3 SOLUTION RESPIRATORY (INHALATION) at 20:30

## 2017-12-01 RX ADMIN — IPRATROPIUM BROMIDE AND ALBUTEROL SULFATE SCH: .5; 3 SOLUTION RESPIRATORY (INHALATION) at 21:31

## 2017-12-01 NOTE — PDOC
History of Present Illness





<Cirilo Dyer - Last Filed: 12/01/17 21:17>





- General


History Source: Patient


Exam Limitations: No Limitations





- History of Present Illness


Initial Comments: 


12/02/17 02:40


The patient is a 67-year-old female, with a significant past medical history or 

asthma, COPD, and HTN, who presents to the ED with a cough productive of white 

sputum, wheezing, and bilateral rib pain. The pt was admitted back in late 

October for asthma exacerbation and bronchitis that was treated with 

prednisone. Pt received another course of prednisone from her PCP, Dr. Eden, 

and is currently on daily prednisone 10mg daily. Pt reports that she went to 

[facility] today due to her rib pain which she relates to her cough. During her 

visit, the pt was noted to be tachycardic to 172 and EMS was activated. Per EMS 

report, the patient was in atrial flutter, which resolved after the patient was 

asked to bear down and was given fluids. Pt denies any fevers, chills, body 

aches, sore throat, runny nose. Pt believes she is experiencing flu symptoms 

since she was working around her boss on Monday who has the flu. 


 


She denies any CP or shortness of breath at this time. She denies any nausea, 

vomiting, diarrhea, or abdominal pain. 





PCP: Dr. Eden





<Clair Alexander - Last Filed: 12/02/17 02:43>





- General


Stated Complaint: PAIN


Time Seen by Provider: 12/01/17 19:31





Past History





- Past Medical History


Asthma: Yes


COPD: No


HTN: Yes





- Suicide/Smoking/Psychosocial Hx


Smoking History: Never smoked


Have you smoked in the past 12 months: No


Information on smoking cessation initiated: No


Hx Alcohol Use: No


Drug/Substance Use Hx: No


Substance Use Type: None


Hx Substance Use Treatment: No





<Cirilo Dyer - Last Filed: 12/01/17 21:17>





<Clair Alexander - Last Filed: 12/02/17 02:43>





- Past Medical History


Allergies/Adverse Reactions: 


 Allergies











Allergy/AdvReac Type Severity Reaction Status Date / Time


 


No Known Drug Allergies Allergy   Verified 12/01/17 18:49











Home Medications: 


Ambulatory Orders





Acetaminophen [Tylenol .Regular Strength -] 650 mg PO Q6H PRN #0 tablet 10/27/

17 


Albuterol 2.5/Ipratropium 0.5 [Duoneb -] 1 amp NEB Q4H PRN #0 amp 10/27/17 


Budesonide/Formeterol Fumarate [SYMBICORT 160/4.5mcg -] 2 puff IH BID #1 

inhaler 10/27/17 


Hydroxyzine HCl [Atarax -] 25 mg PO HS PRN #30 tablet 10/27/17 


Insulin (Novolog) [Novolog Flexpen] 5 units SQ ACHS #1 pen 10/27/17 


Montelukast Na [Singulair -] 10 mg PO HS #30 tablet 10/27/17 


Beclomethasone Dipropionate [Qvar] 40 mcg IH DAILY 12/01/17 


Cyclobenzaprine HCl [Flexeril -] 10 mg PO HS 12/01/17 


Sulfasalazine [Azulfidine] 500 mg PO DAILY 12/01/17 


Valacyclovir HCl [Valtrex] 500 mg PO DAILY 12/01/17 











**Review of Systems





- Review of Systems


Able to Perform ROS?: Yes


Comments:: 


12/02/17 02:41


GENERAL/CONSTITUTIONAL: No fever or chills. No weakness.


HEAD, EYES, EARS, NOSE AND THROAT: No change in vision. No ear pain or 

discharge. No sore throat.


GASTROINTESTINAL: No nausea, vomiting, diarrhea or constipation.


GENITOURINARY: No dysuria, frequency, or change in urination.


CARDIOVASCULAR: No chest pain or shortness of breath.


RESPIRATORY: (+)cough, wheezing. No hemoptysis.


MUSCULOSKELETAL: (+)Bilateral rib pain. No joint  swelling or pain. No neck or 

back pain.


SKIN: No rash


NEUROLOGIC: No headache, vertigo, loss of consciousness, or change in strength/

sensation.


ENDOCRINE: No increased thirst. No abnormal weight change.


HEMATOLOGIC/LYMPHATIC: No anemia, easy bleeding, or history of blood clots.


ALLERGIC/IMMUNOLOGIC: No hives or skin allergy.





<Clair Alexander - Last Filed: 12/02/17 02:43>





*Physical Exam





- Vital Signs


 Last Vital Signs











Temp Pulse Resp BP Pulse Ox


 


 98.5 F   75   20   128/77   98 


 


 12/01/17 18:47  12/01/17 18:47  12/01/17 18:47  12/01/17 18:47  12/01/17 18:47














<Cirilo Dyer - Last Filed: 12/01/17 21:17>





- Vital Signs


 Last Vital Signs











Temp Pulse Resp BP Pulse Ox


 


 98.5 F   75   20   128/77   98 


 


 12/01/17 18:47  12/01/17 18:47  12/01/17 18:47  12/01/17 18:47  12/01/17 18:47














- Physical Exam


Comments: 


12/02/17 02:42


GENERAL: Awake, alert, and fully oriented, in no acute distress


HEAD: No signs of trauma


EYES: PERRLA, EOMI, sclera anicteric, conjunctiva clear


ENT: Auricles normal inspection, hearing grossly normal, nares patent, 

oropharynx clear without exudates. Moist mucosa


NECK: Normal ROM, supple, no lymphadenopathy, JVD, or masses


LUNGS: (+)diffuse wheezing with fair air movement. No crackles


HEART: Regular rate and rhythm, normal S1 and S2, no murmurs, rubs or gallops


ABDOMEN: Soft, nontender, normoactive bowel sounds.  No guarding, no rebound.  

No masses


MSK: (+)bilateral anterior rib tenderness to palpation.


EXTREMITIES: Normal range of motion, no edema.  No clubbing or cyanosis. No 

cords, erythema, or tenderness


BACK: No midline spinal tenderness in cervical/thoracic/lumbar region


NEUROLOGICAL:  Normal speech, cranial nerves intact, negative pronator drift, 5/

5 strength in all 4 extremities, normal sensation to light touch in all 4 

extremities, normal cerebellar exam, normal gait, normal reflexes and tone


SKIN: Warm, Dry, normal turgor, no rashes or lesions noted.





<Clair Alexander - Last Filed: 12/02/17 02:43>





**Heart Score/ECG Review


  ** #1





12/01/17 20:13


Twelve-lead EKG was performed and reviewed by me. Normal sinus rhythm, rate 77. 

Normal axis. No ST elevations. T wave inversion in lead 3.





  ** #2





12/01/17 20:14


EKG from [facility] reviewed. My interpretation: Multifocal atrial tachycardia, 

rate 172





<Cirilo Dyer - Last Filed: 12/01/17 21:17>





ED Treatment Course





- LABORATORY


CBC & Chemistry Diagram: 


 12/01/17 20:14





 12/01/17 20:14





<Cirilo Dyer - Last Filed: 12/01/17 21:17>





- LABORATORY


CBC & Chemistry Diagram: 


 12/01/17 20:14





 12/01/17 20:14





- Medications


Given in the ED: 


ED Medications














Discontinued Medications














Generic Name Dose Route Start Last Admin





  Trade Name Concha  PRN Reason Stop Dose Admin


 


Prednisone  50 mg  12/01/17 19:58  12/01/17 20:11





  Deltasone -  PO  12/01/17 19:59  50 mg





  ONCE ONE   Administration














<Clair Alexander - Last Filed: 12/02/17 02:43>





Medical Decision Making





- Medical Decision Making





12/01/17 20:11


67-year-old female with a history of asthma, hypertension presents from [facility] urgent care with resolved atrial flutter. The patient presented to [facility] 

for rib pain that she relates to her coughing. Vitals in the emergency 

department are unremarkable. Exam with diffuse wheezing and fair air movement. 

Patient appears to be having an asthma exacerbation however the report of 

atrial flutter is concerning as the patient's chads tasks score is 4. Will 

treat her asthma with steroids and nebs and discuss anticoagulation with 

. We'll also obtain a chest x-ray given the rib pain and check a flu 

swab as patient has multiple sick contacts.





12/01/17 21:17


Chest x-ray is clear. Labs thus far unremarkable, troponin is pending. Patient 

feels better with nebulizer treatment and remained in normal sinus rhythm with 

a rate in the 70s. Will hold off on any further treatments to prevent 

tachycardia. Patient has also received prednisone. Case discussed with 

, given tachycardia to the 170s in the field (and on EKG sent to us from [facility]) and report of atrial flutter will admit overnight.





Case discussed in detail with admitting physician including history, physical 

exam and ancillary studies.





Admitting physician has assumed care for the patient, will follow all pending 

diagnostics and will complete the evaluation and treatment.  





<Cirilo Dyer - Last Filed: 12/01/17 21:17>





- Medical Decision Making


12/01/17 20:21


Dr. Eden was paged and notified via phone service.





<Clair Alexander - Last Filed: 12/02/17 02:43>





*DC/Admit/Observation/Transfer





- Discharge Dispostion


Admit: Yes





- Attestations


Physician Attestion: 





12/01/17 21:18








I, Dr. Cirilo Dyer MD, attest that this document has been prepared under my 

direction and personally reviewed by me in its entirety.   I further attest, 

that it accurately reflects all work, treatment, procedures and medical decision

-making performed by me.  





<Cirilo Dyer - Last Filed: 12/01/17 21:17>





- Attestations


Scribe Attestion: 


12/01/17 20:21





Documentation prepared by Clair Alexander, acting as medical scribe for Cirilo Dyer MD.








<Clair Alexander - Last Filed: 12/02/17 02:43>


Diagnosis at time of Disposition: 


 Tachycardia








- Discharge Dispostion


Condition at time of disposition: Stable

## 2017-12-01 NOTE — PDOC
History of Present Illness





- General


Stated Complaint: PAIN


Time Seen by Provider: 12/01/17 19:31





Past History





- Past Medical History


Allergies/Adverse Reactions: 


 Allergies











Allergy/AdvReac Type Severity Reaction Status Date / Time


 


No Known Drug Allergies Allergy   Verified 12/01/17 18:49











Home Medications: 


Ambulatory Orders





Prednisone [Deltasone -] 20 mg PO DAILY 10/22/17 


Acetaminophen [Tylenol .Regular Strength -] 650 mg PO Q6H PRN #0 tablet 10/27/

17 


Albuterol 2.5/Ipratropium 0.5 [Duoneb -] 1 amp NEB Q4H PRN #0 amp 10/27/17 


Albuterol Sulfate Inhaler - 2 puff IN PRN #0 inh 10/27/17 


Budesonide/Formeterol Fumarate [SYMBICORT 160/4.5mcg -] 2 puff IH BID #1 

inhaler 10/27/17 


Hydroxyzine HCl [Atarax -] 25 mg PO HS PRN #30 tablet 10/27/17 


Insulin (Novolog) [Novolog Flexpen] 5 units SQ ACHS #1 pen 10/27/17 


Montelukast Na [Singulair -] 10 mg PO HS #30 tablet 10/27/17 


Prednisone [Deltasone -] 30 mg PO DAILY #20 tablet 10/27/17 


Tiotropium Bromide [Spiriva] 1 puff IH DAILY #1 inh 10/27/17 








Asthma: Yes


COPD: No


HTN: Yes





- Suicide/Smoking/Psychosocial Hx


Smoking History: Never smoked


Have you smoked in the past 12 months: No


Information on smoking cessation initiated: No


Hx Alcohol Use: No


Drug/Substance Use Hx: No


Substance Use Type: None


Hx Substance Use Treatment: No





*Physical Exam





- Vital Signs


 Last Vital Signs











Temp Pulse Resp BP Pulse Ox


 


 98.5 F   75   20   128/77   98 


 


 12/01/17 18:47  12/01/17 18:47  12/01/17 18:47  12/01/17 18:47  12/01/17 18:47














*DC/Admit/Observation/Transfer





- Referrals


Referrals: 


Halima Eden MD [Primary Care Provider] - 





- Patient Instructions





- Post Discharge Activity

## 2017-12-02 LAB
BASOPHILS # BLD: 0.2 % (ref 0–2)
DEPRECATED RDW RBC AUTO: 13.7 % (ref 11.6–15.6)
EOSINOPHIL # BLD: 0 % (ref 0–4.5)
MCH RBC QN AUTO: 30 PG (ref 25.7–33.7)
MCHC RBC AUTO-ENTMCNC: 32.1 G/DL (ref 32–36)
MCV RBC: 93.5 FL (ref 80–96)
NEUTROPHILS # BLD: 68.3 % (ref 42.8–82.8)
PLATELET # BLD AUTO: 162 K/MM3 (ref 134–434)
PMV BLD: 7.6 FL (ref 7.5–11.1)
WBC # BLD AUTO: 7.6 K/MM3 (ref 4–10)

## 2017-12-02 RX ADMIN — INSULIN ASPART SCH UNITS: 100 INJECTION, SOLUTION INTRAVENOUS; SUBCUTANEOUS at 21:45

## 2017-12-02 RX ADMIN — BUDESONIDE AND FORMOTEROL FUMARATE DIHYDRATE SCH PUFF: 80; 4.5 AEROSOL RESPIRATORY (INHALATION) at 10:10

## 2017-12-02 RX ADMIN — INSULIN ASPART SCH: 100 INJECTION, SOLUTION INTRAVENOUS; SUBCUTANEOUS at 18:51

## 2017-12-02 RX ADMIN — METHYLPREDNISOLONE SODIUM SUCCINATE SCH MG: 40 INJECTION, POWDER, FOR SOLUTION INTRAMUSCULAR; INTRAVENOUS at 10:05

## 2017-12-02 RX ADMIN — INSULIN ASPART SCH UNITS: 100 INJECTION, SOLUTION INTRAVENOUS; SUBCUTANEOUS at 06:30

## 2017-12-02 RX ADMIN — MONTELUKAST SODIUM SCH MG: 10 TABLET, COATED ORAL at 21:46

## 2017-12-02 RX ADMIN — METHYLPREDNISOLONE SODIUM SUCCINATE SCH MG: 40 INJECTION, POWDER, FOR SOLUTION INTRAMUSCULAR; INTRAVENOUS at 01:12

## 2017-12-02 RX ADMIN — METHYLPREDNISOLONE SODIUM SUCCINATE SCH MG: 40 INJECTION, POWDER, FOR SOLUTION INTRAMUSCULAR; INTRAVENOUS at 17:31

## 2017-12-02 RX ADMIN — BUDESONIDE AND FORMOTEROL FUMARATE DIHYDRATE SCH PUFF: 80; 4.5 AEROSOL RESPIRATORY (INHALATION) at 21:47

## 2017-12-02 RX ADMIN — INSULIN ASPART SCH: 100 INJECTION, SOLUTION INTRAVENOUS; SUBCUTANEOUS at 11:48

## 2017-12-02 NOTE — HP
Admitting History and Physical





- Primary Care Physician


PCP: Halima Eden





- Admission


Chief Complaint: PALPITATIONS/WHEEZING/SOB


History of Present Illness: 





PATIENT HAS HISTORY OF ASTHMA,HTN,DM HERE WITH TACHYCARDIA WITH SVT IN ER 

TRREATED AND PLACED ON CARDIOLOGY FLOOR.


PATIENT C/O WHEEZING, SOB, DIZZINESS AND COUGH NON-PROD.


History Source: Patient, Medical Record





- Past Medical History


Cardiovascular: Yes: Other (routine nuclear stress test about 1 ya -. 

reportedly normal).  No: AFIB


Pulmonary: Yes: Asthma ( -. not active)


...LMP Comment: post menopause


...Pregnant: No


Endocrine: Yes: Diabetes Mellitus





- Smoking History


Smoking history: Never smoked


Have you smoked in the past 12 months: No





- Alcohol/Substance Use


Hx Alcohol Use: No


History of Substance Use: reports: None





- Social History


History of Recent Travel: No





Home Medications





- Allergies


Allergies/Adverse Reactions: 


 Allergies











Allergy/AdvReac Type Severity Reaction Status Date / Time


 


No Known Drug Allergies Allergy   Verified 12/01/17 18:49














- Home Medications


Home Medications: 


Ambulatory Orders





Acetaminophen [Tylenol .Regular Strength -] 650 mg PO Q6H PRN #0 tablet 10/27/

17 


Albuterol 2.5/Ipratropium 0.5 [Duoneb -] 1 amp NEB Q4H PRN #0 amp 10/27/17 


Budesonide/Formeterol Fumarate [SYMBICORT 160/4.5mcg -] 2 puff IH BID #1 

inhaler 10/27/17 


Hydroxyzine HCl [Atarax -] 25 mg PO HS PRN #30 tablet 10/27/17 


Insulin (Novolog) [Novolog Flexpen] 5 units SQ ACHS #1 pen 10/27/17 


Montelukast Na [Singulair -] 10 mg PO HS #30 tablet 10/27/17 


Beclomethasone Dipropionate [Qvar] 40 mcg IH DAILY 12/01/17 


Cyclobenzaprine HCl [Flexeril -] 10 mg PO HS 12/01/17 


Sulfasalazine [Azulfidine] 500 mg PO DAILY 12/01/17 


Valacyclovir HCl [Valtrex] 500 mg PO DAILY 12/01/17 











Review of Systems





- Review of Systems


Constitutional: reports: Other


Eyes: reports: No Symptoms


HENT: reports: No Symptoms


Neck: reports: No Symptoms


Cardiovascular: reports: Palpitations, Shortness of Breath


Respiratory: reports: Cough, SOB


Gastrointestinal: reports: No Symptoms


Genitourinary: reports: No Symptoms


Musculoskeletal: reports: No Symptoms


Integumentary: reports: No Symptoms


Neurological: reports: No Symptoms


Endocrine: reports: No Symptoms


Hematology/Lymphatic: reports: No Symptoms


Psychiatric: reports: No Symptoms





Physical Examination


Vital Signs: 


 Vital Signs











Temperature  99.1 F   12/02/17 02:00


 


Pulse Rate  81   12/02/17 02:00


 


Respiratory Rate  20   12/02/17 02:00


 


Blood Pressure  113/61   12/02/17 02:00


 


O2 Sat by Pulse Oximetry (%)  93 L  12/01/17 22:30











Constitutional: Yes: Mild Distress


Eyes: Yes: WNL


HENT: Yes: WNL


Neck: Yes: WNL


Cardiovascular: Yes: Tachycardia


Respiratory: Yes: Cough, Wheezes


Gastrointestinal: Yes: WNL


Breast(s): Yes: WNL


Musculoskeletal: Yes: WNL


Extremities: Yes: WNL


Edema: No


Peripheral Pulses WNL: Yes


Integumentary: Yes: WNL


Wound/Incision: Yes: Clean/Dry


Neurological: Yes: WNL


...Motor Strength: WNL


Psychiatric: Yes: WNL


Labs: 


 CBC, BMP





 12/02/17 11:40 





 12/01/17 20:14 











Imaging





- Results


Chest X-ray: Pending





Problem List





- Problems


(1) Tachycardia


Code(s): R00.0 - TACHYCARDIA, UNSPECIFIED   





(2) Asthma exacerbation


Code(s): J45.901 - UNSPECIFIED ASTHMA WITH (ACUTE) EXACERBATION   


Qualifiers: 


   Asthma severity: moderate 





(3) Diabetes type 2, controlled


Code(s): E11.9 - TYPE 2 DIABETES MELLITUS WITHOUT COMPLICATIONS   


Qualifiers: 


   Diabetes mellitus complication status: without complication 





Assessment/Plan





SVT/SYNCOPE WORKUP


STRESS TEST NEGATIVE THIS YEAR


CARDIOLOGY EVAL


TELEMETRY


BGM CHECKS


PULM EVAL

## 2017-12-02 NOTE — EKG
Test Reason : 

Blood Pressure : ***/*** mmHG

Vent. Rate : 077 BPM     Atrial Rate : 077 BPM

   P-R Int : 136 ms          QRS Dur : 070 ms

    QT Int : 378 ms       P-R-T Axes : 039 062 021 degrees

   QTc Int : 427 ms

 

NORMAL SINUS RHYTHM

NORMAL ECG

WHEN COMPARED WITH ECG OF 21-OCT-2017 22:12,

QUESTIONABLE CHANGE IN QRS AXIS

NONSPECIFIC T WAVE ABNORMALITY NO LONGER EVIDENT IN LATERAL LEADS

Confirmed by FABIAN NICHOLAS, RANI ([id number]) on 12/2/2017 9:22:32 AM

 

Referred By:             Confirmed By:RANI PERALTA MD

## 2017-12-02 NOTE — CONS
DATE OF CONSULTATION:  12/02/2017

 

REFERRING PHYSICIAN:  Halima Eden MD

 

The patient is a 67-year-old _____ female known to me from previous hospitalization,

past medical history of asthma for approximately 30 years, hypertension, pre

diabetes, who is a nonsmoker, admitted to [facility] with the

complaint of 2 day history of increasing shortness of breath, cough productive of

white sputum and bilateral rib pain.  Patient was recently hospitalized in October,

secondary to asthma exacerbation.  At the time, she was treated with steroids and

inhaled bronchodilators with good clinical response.  She was doing relatively well

until the past couple of days when she started developing the above symptoms. 

Yesterday, she went to [facility] and at the time was found to have

tachycardia at the rate of 172.  EMS was called and the patient was brought to the

emergency room.  Patient was transferred to the medical floor for further management.

 Patient, as stated before, is a nonsmoker.  There was no history of occupational

exposure to chemical fumes.  There was no history of recent travel.  She denies any

history of occupational exposure to chemical fumes.  She thinks that she is

maintained on inhaled bronchodilator Symbicort, as well as Spiriva, as well as

prednisone 10 mg daily as an outpatient.  

 

PAST MEDICAL HISTORY:  Includes asthma, hypertension, prediabetes.  

 

REVIEW OF SYSTEMS:  No orthopnea, no PND, positive mild cough.  There is no wheezing,

positive chest tightness, no chest pain, no nausea, no vomiting, no diaphoresis.  

 

PHYSICAL EXAMINATION:

General:  The patient is a well-developed, well-nourished female awake, alert in no

acute distress.  

Vitals:  She is currently afebrile.  Blood pressure 113/61, respiratory rate is 20,

O2 saturation is 93% on room air.

HEENT:  Normocephalic, atraumatic.  

Neck:  Supple.

Heart:  Regular with S1, S2.  

Chest:  Scattered bilateral wheezes.

Abdomen:  Soft.  Bowel sounds are positive.

Extremities:  No cyanosis, edema.  

 

LAB:  WBC is 5.8, hemoglobin 14.7, hematocrit 44.1 and platelet count 148,000.  There

are 28 polys, 25 lymphocytes, 29 monocytes, 1 basophil.  INR is 1.12.  BUN is 16,

creatinine 0.6. 

 

Chest x-ray:  Poor inspiratory effort.  Low lung volumes bilaterally.  Improved

aeration of right middle lobe from previous exam October 25.  No definitive

infiltrates or effusions.  

 

IMPRESSION:  

1.  Mild asthma with exacerbation.   

2.  Tachycardia, supraventricular tachycardia.  

3.  Hypertension.  

 

PLAN:  Steroids, bronchodilator, supplemental O2, PFT as outpatient, IG level, as

well as allergy testing as outpatient, also obtain a follow up chest CT as an

outpatient in approximately 4-6 weeks to document resolution of right middle lobe

atelectasis.  

 

 

TAN MAI M.D.

 

[id number]

DD: 12/02/2017 11:27

DT: 12/02/2017 12:09

Job #:  [id number]

## 2017-12-02 NOTE — PN
Progress Note (short form)





- Note


Progress Note: 





PULMONARY





CONSULTATION DICTATED 12/2/17





IMP ASTHMA EXACERBATION


      CARDIAC ARRYTHYMIA


      HTN


      H/O RML ATELECTASIS





PLAN STEROIDS


        INHALED BRONCHODILATORS


        O2


        CARDIOLOGY EVALUATION


        PFTS OUTPATIENT


        IGE LEVEL OUTPATIENT


        MONITOR PEAK FLOW


       CHEST CT OUTPATIENT








DR MAI


      





Problem List





- Problems


(1) Tachycardia


Code(s): R00.0 - TACHYCARDIA, UNSPECIFIED   





(2) Asthma exacerbation


Code(s): J45.901 - UNSPECIFIED ASTHMA WITH (ACUTE) EXACERBATION   





(3) Respiratory distress


Code(s): R06.00 - DYSPNEA, UNSPECIFIED   





(4) Second hand smoke exposure


Code(s): Z77.22 - CNTCT W AND EXPSR TO ENVIRON TOBACCO SMOKE (ACUTE) (CHRONIC)

## 2017-12-02 NOTE — CON.CARD
Consult


Consult Specialty:: Cardiology


Referred by:: Dr. Eden


Reason for Consultation:: Tachycardia





- History of Present Illness


Chief Complaint: Tachycardia


History of Present Illness: 


67-year-old woman with a PMHx of asthma, COPD, tachycardia, likely MAT in 

October 2017 and HTN, who was admitted 12/1/2017 with a cough productive of 

white sputum, wheezing, and bilateral rib pain with normal nuclear stress test 

in August 2017. She was found to have recurrent tachycardia, long OR, likely 

atrial tachycardia at 172 BPM. She has been stable from cardiac stand point 

since admission without recurrent tachycardia on telemetry. 





She was admitted in late October for asthma exacerbation and bronchitis that 

was treated with prednisone when she was found to have MAT. Pt received another 

course of prednisone recently and is currently on daily prednisone 10mg daily. 

She was seen at [facility] 12/1/2017 due to her rib pain which she relates to her 

cough. During her visit, the pt was noted to be tachycardic to 172 with ECG 

evidence of atrial tachycardia, not atrial flutter. 





She reports no chest pain, SOB at rest, palpitation, dizziness, syncope or near 

syncope. 





- History Source


History Provided By: Patient


Limitations to Obtaining History: No Limitations





- Past Medical History


Cardio/Vascular: Yes: Other (routine nuclear stress test about 1 ya -. 

reportedly normal).  No: AFIB


Pulmonary: Yes: Asthma ( -. not active)


...LMP Comment: post menopause


...Pregnant: No


Endocrine: Yes: Diabetes Mellitus





- Alcohol/Substance Use


Hx Alcohol Use: No


History of Substance Use: reports: None





- Smoking History


Smoking history: Never smoked


Have you smoked in the past 12 months: No





- Social History


History of Recent Travel: No





Home Medications





- Allergies


Allergies/Adverse Reactions: 


 Allergies











Allergy/AdvReac Type Severity Reaction Status Date / Time


 


No Known Drug Allergies Allergy   Verified 12/01/17 18:49














- Home Medications


Home Medications: 


Ambulatory Orders





Acetaminophen [Tylenol .Regular Strength -] 650 mg PO Q6H PRN #0 tablet 10/27/

17 


Albuterol 2.5/Ipratropium 0.5 [Duoneb -] 1 amp NEB Q4H PRN #0 amp 10/27/17 


Budesonide/Formeterol Fumarate [SYMBICORT 160/4.5mcg -] 2 puff IH BID #1 

inhaler 10/27/17 


Hydroxyzine HCl [Atarax -] 25 mg PO HS PRN #30 tablet 10/27/17 


Insulin (Novolog) [Novolog Flexpen] 5 units SQ ACHS #1 pen 10/27/17 


Montelukast Na [Singulair -] 10 mg PO HS #30 tablet 10/27/17 


Beclomethasone Dipropionate [Qvar] 40 mcg IH DAILY 12/01/17 


Cyclobenzaprine HCl [Flexeril -] 10 mg PO HS 12/01/17 


Sulfasalazine [Azulfidine] 500 mg PO DAILY 12/01/17 


Valacyclovir HCl [Valtrex] 500 mg PO DAILY 12/01/17 











Review of Systems





- Review of Systems


Constitutional: reports: No Symptoms


Eyes: reports: No Symptoms


HENT: reports: No Symptoms


Neck: reports: No Symptoms


Cardiovascular: reports: Palpitations, Shortness of Breath


Respiratory: reports: Exercise Intolerance, SOB, Wheezing


Gastrointestinal: reports: No Symptoms


Genitourinary: reports: No Symptoms


Breasts: reports: No Symptoms Reported


Musculoskeletal: reports: Other (Rib pain)


Integumentary: reports: No Symptoms


Neurological: reports: No Symptoms


Endocrine: reports: No Symptoms


Hematology/Lymphatic: reports: No Symptoms


Vital Signs: 


 Vital Signs











Temperature  97.7 F   12/02/17 15:00


 


Pulse Rate  79   12/02/17 15:00


 


Respiratory Rate  18   12/02/17 15:00


 


Blood Pressure  120/63   12/02/17 15:00


 


O2 Sat by Pulse Oximetry (%)  93 L  12/02/17 10:00











Constitutional: Yes: Well Nourished, No Distress, Calm


Eyes: Yes: Conjunctiva Clear, EOM Intact


HENT: Yes: Atraumatic, Normocephalic


Respiratory: Yes: Regular, Poor Air Entry, Wheezes


Gastrointestinal: Yes: WNL, Normal Bowel Sounds, Soft


Cardiovascular: Yes: Regular Rate and Rhythm


JVD: No


Carotid Bruit: No


PMI: Non-Displaced


Heart Sounds: Yes: S1, S2


Extremities: Yes: WNL


Edema: No


Peripheral Pulses WNL: Yes





- Other Data


Labs, Other Data: 


 CBC, BMP





 12/02/17 11:40 





 12/01/17 20:14 





 INR, PTT











INR  1.12  (0.82-1.09)   12/01/17  20:14    








 Troponin, BNP











  12/01/17





  20:14


 


Troponin I  0.02








 Troponin, BNP











  12/01/17





  20:14


 


Troponin I  0.02











Normal sinus rhythm, normal axis. Normal ST and T. Normal ECG.





Imaging





- Results


EKG: Image Reviewed (Normal sinus rhythm, normal axis. Normal ST and T. Normal 

ECG.)





Assessment/Plan


67-year-old woman with a PMHx of asthma, COPD, tachycardia, likely MAT in 

October 2017 and HTN, who was admitted 12/1/2017 with a cough productive of 

white sputum, wheezing, and bilateral rib pain with normal nuclear stress test 

in August 2017. She was found to have recurrent tachycardia, long OR, likely 

atrial tachycardia at 172 BPM. She has been stable from cardiac stand point 

since admission without recurrent tachycardia on telemetry.  





Short lasting tachycardia, long RP, likely atrial tachycardia. No evidence of 

atrial flutter. 


Add Diltiazem  mg daily to prevent recurrent tachycardia since she has 

severe asthma. 


Continue current therapy for asthma as per Dr. Eden.

## 2017-12-03 RX ADMIN — INSULIN ASPART SCH UNITS: 100 INJECTION, SOLUTION INTRAVENOUS; SUBCUTANEOUS at 06:36

## 2017-12-03 RX ADMIN — MONTELUKAST SODIUM SCH MG: 10 TABLET, COATED ORAL at 21:37

## 2017-12-03 RX ADMIN — METHYLPREDNISOLONE SODIUM SUCCINATE SCH: 40 INJECTION, POWDER, FOR SOLUTION INTRAMUSCULAR; INTRAVENOUS at 11:09

## 2017-12-03 RX ADMIN — BUDESONIDE AND FORMOTEROL FUMARATE DIHYDRATE SCH PUFF: 80; 4.5 AEROSOL RESPIRATORY (INHALATION) at 10:17

## 2017-12-03 RX ADMIN — METHYLPREDNISOLONE SODIUM SUCCINATE SCH MG: 40 INJECTION, POWDER, FOR SOLUTION INTRAMUSCULAR; INTRAVENOUS at 11:10

## 2017-12-03 RX ADMIN — INSULIN ASPART SCH UNITS: 100 INJECTION, SOLUTION INTRAVENOUS; SUBCUTANEOUS at 21:55

## 2017-12-03 RX ADMIN — INSULIN ASPART SCH UNITS: 100 INJECTION, SOLUTION INTRAVENOUS; SUBCUTANEOUS at 17:31

## 2017-12-03 RX ADMIN — INSULIN ASPART SCH UNITS: 100 INJECTION, SOLUTION INTRAVENOUS; SUBCUTANEOUS at 12:14

## 2017-12-03 RX ADMIN — BUDESONIDE AND FORMOTEROL FUMARATE DIHYDRATE SCH PUFF: 80; 4.5 AEROSOL RESPIRATORY (INHALATION) at 21:38

## 2017-12-03 RX ADMIN — METHYLPREDNISOLONE SODIUM SUCCINATE SCH MG: 40 INJECTION, POWDER, FOR SOLUTION INTRAMUSCULAR; INTRAVENOUS at 01:40

## 2017-12-03 RX ADMIN — METHYLPREDNISOLONE SODIUM SUCCINATE SCH MG: 40 INJECTION, POWDER, FOR SOLUTION INTRAMUSCULAR; INTRAVENOUS at 21:56

## 2017-12-03 NOTE — PN
Progress Note, Physician


Chief Complaint: 





EPISODE SVT 170MIN LAST NIGHT


NO CP OR SOB





- Current Medication List


Current Medications: 


Active Medications





Acetaminophen (Tylenol -)  650 mg PO Q6H PRN


   PRN Reason: FEVER OR PAIN


Budesonide/Formoterol Fumarate (Symbicort 80/4.5mcg -)  2 puff IH BID [facility]


   Last Admin: 12/03/17 10:17 Dose:  2 puff


Diltiazem HCl (Cardizem Cd -)  120 mg PO DAILY [facility]


   Last Admin: 12/02/17 18:51 Dose:  120 mg


Hydroxyzine HCl (Atarax -)  25 mg PO BID PRN


   PRN Reason: FOR ITCHING


Insulin Aspart (Novolog Vial Sliding Scale -)  1 vial SQ ACHS ADAM


   PRN Reason: Protocol


   Last Admin: 12/03/17 12:14 Dose:  6 units


Methylprednisolone Sodium Succinate (Solu-Medrol -)  40 mg IVPUSH Q12H [facility]


   Last Admin: 12/03/17 11:10 Dose:  40 mg


Montelukast Sodium (Singulair -)  10 mg PO HS [facility]


   Last Admin: 12/02/17 21:46 Dose:  10 mg


Sulfasalazine (Azulfidine En-Tabs -)  500 mg PO BID [facility]


   Last Admin: 12/03/17 10:16 Dose:  Not Given











- Objective


Vital Signs: 


 Vital Signs











Temperature  97.7 F   12/03/17 05:55


 


Pulse Rate  62   12/03/17 05:55


 


Respiratory Rate  18   12/03/17 05:55


 


Blood Pressure  100/54   12/03/17 05:55


 


O2 Sat by Pulse Oximetry (%)  97   12/03/17 09:00











Constitutional: Yes: No Distress


Eyes: Yes: WNL


HENT: Yes: WNL


Neck: Yes: WNL


Cardiovascular: Yes: WNL


Respiratory: Yes: Wheezes


Gastrointestinal: Yes: WNL


Musculoskeletal: Yes: WNL


Extremities: Yes: WNL


Edema: No


Peripheral Pulses WNL: Yes


Integumentary: Yes: WNL


Wound/Incision: Yes: Clean/Dry


Neurological: Yes: WNL


...Motor Strength: WNL


Psychiatric: Yes: WNL


Labs: 


 CBC, BMP





 12/02/17 11:40 





 12/01/17 20:14 





 INR, PTT











INR  1.12  (0.82-1.09)   12/01/17  20:14    














Problem List





- Problems


(1) Tachycardia


Code(s): R00.0 - TACHYCARDIA, UNSPECIFIED   





(2) Asthma exacerbation


Code(s): J45.901 - UNSPECIFIED ASTHMA WITH (ACUTE) EXACERBATION   


Qualifiers: 


   Asthma severity: moderate 





(3) Diabetes type 2, controlled


Code(s): E11.9 - TYPE 2 DIABETES MELLITUS WITHOUT COMPLICATIONS   


Qualifiers: 


   Diabetes mellitus complication status: without complication 





Assessment/Plan





CARDIZEM STARTED


CARDIOLOGY F/U


NEBS


AVOID ALBUTEROL

## 2017-12-03 NOTE — PN
Progress Note, Physician


History of Present Illness: 





PULMONARY





ALERT, SOB IMPROVED





- Current Medication List


Current Medications: 


Active Medications





Acetaminophen (Tylenol -)  650 mg PO Q6H PRN


   PRN Reason: FEVER OR PAIN


Budesonide/Formoterol Fumarate (Symbicort 80/4.5mcg -)  2 puff IH BID [facility]


   Last Admin: 12/03/17 10:17 Dose:  2 puff


Diltiazem HCl (Cardizem Cd -)  120 mg PO DAILY [facility]


   Last Admin: 12/02/17 18:51 Dose:  120 mg


Hydroxyzine HCl (Atarax -)  25 mg PO BID PRN


   PRN Reason: FOR ITCHING


Insulin Aspart (Novolog Vial Sliding Scale -)  1 vial SQ ACHS ADAM


   PRN Reason: Protocol


   Last Admin: 12/03/17 06:36 Dose:  8 units


Methylprednisolone Sodium Succinate (Solu-Medrol -)  40 mg IVPUSH Q8H-IV [facility]


   Last Admin: 12/03/17 01:40 Dose:  40 mg


Montelukast Sodium (Singulair -)  10 mg PO HS [facility]


   Last Admin: 12/02/17 21:46 Dose:  10 mg


Sulfasalazine (Azulfidine En-Tabs -)  500 mg PO BID [facility]


   Last Admin: 12/03/17 10:16 Dose:  Not Given











- Objective


Vital Signs: 


 Vital Signs











Temperature  97.7 F   12/03/17 05:55


 


Pulse Rate  62   12/03/17 05:55


 


Respiratory Rate  18   12/03/17 05:55


 


Blood Pressure  100/54   12/03/17 05:55


 


O2 Sat by Pulse Oximetry (%)  95   12/02/17 21:00











Constitutional: Yes: Well Nourished, Calm


Eyes: Yes: WNL


HENT: Yes: WNL


Neck: Yes: WNL


Cardiovascular: Yes: Regular Rate and Rhythm, S1, S2


Respiratory: Yes: CTA Bilaterally


Gastrointestinal: Yes: Normal Bowel Sounds, Soft


Extremities: Yes: WNL


Edema: No





Problem List





- Problems


(1) Tachycardia


Code(s): R00.0 - TACHYCARDIA, UNSPECIFIED   





(2) Asthma exacerbation


Code(s): J45.901 - UNSPECIFIED ASTHMA WITH (ACUTE) EXACERBATION   


Qualifiers: 


   Asthma severity: moderate 





(3) Respiratory distress


Code(s): R06.00 - DYSPNEA, UNSPECIFIED   





(4) Second hand smoke exposure


Code(s): Z77.22 - CNTCT W AND EXPSR TO ENVIRON TOBACCO SMOKE (ACUTE) (CHRONIC) 

  





Assessment/Plan








IMP ASTHMA EXACERBATION IMPROVED


      CARDIAC ARRYTHYMIA ?MAT


      HTN


      H/O RML ATELECTASIS





PLAN STEROID TAPER


        INHALED BRONCHODILATORS


        O2


        CARDIOLOGY EVALUATION


        PFTS OUTPATIENT


        IGE LEVEL OUTPATIENT


        MONITOR PEAK FLOW


       CHEST CT OUTPATIENT


       CARDIZEM








DR MAI


      





 Problem List 





- Problems


(1) Tachycardia


Code(s): R00.0 - TACHYCARDIA, UNSPECIFIED   





(2) Asthma exacerbation


Code(s): J45.901 - UNSPECIFIED ASTHMA WITH (ACUTE) EXACERBATION   





(3) Respiratory distress


Code(s): R06.00 - DYSPNEA, UNSPECIFIED   





(4) Second hand smoke exposure


Code(s): Z77.22 - CNTCT W AND EXPSR TO ENVIRON TOBACCO SMOKE (ACUTE) (CHRONIC)

## 2017-12-03 NOTE — PN
Progress Note, Physician


Chief Complaint: 


Patient appears comfortable with improved SOB. No symptoms of palpitation or 

chest pain.





Recurrent SVT, likely atrial tachycardia noted on tele around 4 pm 12/2/2017.  


History of Present Illness: 


67-year-old woman with a PMHx of asthma, COPD, tachycardia, likely MAT in 

October 2017 and HTN, who was admitted 12/1/2017 with a cough productive of 

white sputum, wheezing, and bilateral rib pain with normal nuclear stress test 

in August 2017. She was found to have recurrent tachycardia, long WI, likely 

atrial tachycardia at 172 BPM. 





Recurrent SVT, likely atrial tachycardia noted on tele around 4 pm 12/2/2017. 

Diltiazem 120 mg daily started. 





She reports no chest pain, SOB at rest, palpitation, dizziness, syncope or near 

syncope. 





- Current Medication List


Current Medications: 


Active Medications





Acetaminophen (Tylenol -)  650 mg PO Q6H PRN


   PRN Reason: FEVER OR PAIN


Budesonide/Formoterol Fumarate (Symbicort 80/4.5mcg -)  2 puff IH BID [facility]


   Last Admin: 12/03/17 10:17 Dose:  2 puff


Diltiazem HCl (Cardizem Cd -)  120 mg PO DAILY [facility]


   Last Admin: 12/03/17 15:50 Dose:  Not Given


Hydroxyzine HCl (Atarax -)  25 mg PO BID PRN


   PRN Reason: FOR ITCHING


Insulin Aspart (Novolog Vial Sliding Scale -)  1 vial SQ ACHS [facility]


   PRN Reason: Protocol


   Last Admin: 12/03/17 12:14 Dose:  6 units


Methylprednisolone Sodium Succinate (Solu-Medrol -)  40 mg IVPUSH Q12H [facility]


   Last Admin: 12/03/17 11:10 Dose:  40 mg


Montelukast Sodium (Singulair -)  10 mg PO HS [facility]


   Last Admin: 12/02/17 21:46 Dose:  10 mg


Sulfasalazine (Azulfidine En-Tabs -)  500 mg PO BID [facility]


   Last Admin: 12/03/17 10:16 Dose:  Not Given











- Objective


Vital Signs: 


 Vital Signs











Temperature  98.5 F   12/03/17 15:54


 


Pulse Rate  61   12/03/17 15:54


 


Respiratory Rate  18   12/03/17 15:54


 


Blood Pressure  127/59   12/03/17 15:54


 


O2 Sat by Pulse Oximetry (%)  97   12/03/17 09:00











Constitutional: Yes: Well Nourished, No Distress, Calm


Eyes: Yes: Conjunctiva Clear, EOM Intact


HENT: Yes: Atraumatic, Normocephalic


Neck: Yes: Supple, Trachea Midline


Cardiovascular: Yes: Regular Rate and Rhythm


Respiratory: Yes: Regular, CTA Bilaterally, Other (Prolonged expiration. No 

active wheezing.)


Gastrointestinal: Yes: Normal Bowel Sounds, Soft


...Rectal Exam: Yes: Deferred


Extremities: Yes: WNL


Edema: No


Peripheral Pulses WNL: Yes


Labs: 


 CBC, BMP





 12/02/17 11:40 





 12/01/17 20:14 





 INR, PTT











INR  1.12  (0.82-1.09)   12/01/17  20:14    














Assessment/Plan


67-year-old woman with a PMHx of asthma, COPD, tachycardia, likely MAT in 

October 2017 and HTN, who was admitted 12/1/2017 with a cough productive of 

white sputum, wheezing, and bilateral rib pain with normal nuclear stress test 

in August 2017. She was found to have recurrent tachycardia, long WI, likely 

atrial tachycardia at 172 BPM. Recurrent SVT at 170 BPM, likely atrial 

tachycardia noted on tele around 4 pm 12/2/2017. 





-Recurrent short lasting atrial tachycardia. 


Continue Diltiazem  mg daily. 


Continue current therapy for asthma as per Dr. Eden.

## 2017-12-04 RX ADMIN — INSULIN ASPART SCH UNITS: 100 INJECTION, SOLUTION INTRAVENOUS; SUBCUTANEOUS at 12:19

## 2017-12-04 RX ADMIN — MONTELUKAST SODIUM SCH MG: 10 TABLET, COATED ORAL at 21:35

## 2017-12-04 RX ADMIN — METHYLPREDNISOLONE SODIUM SUCCINATE SCH MG: 40 INJECTION, POWDER, FOR SOLUTION INTRAMUSCULAR; INTRAVENOUS at 22:38

## 2017-12-04 RX ADMIN — METHYLPREDNISOLONE SODIUM SUCCINATE SCH MG: 40 INJECTION, POWDER, FOR SOLUTION INTRAMUSCULAR; INTRAVENOUS at 09:48

## 2017-12-04 RX ADMIN — INSULIN ASPART SCH UNITS: 100 INJECTION, SOLUTION INTRAVENOUS; SUBCUTANEOUS at 06:30

## 2017-12-04 RX ADMIN — INSULIN ASPART SCH UNITS: 100 INJECTION, SOLUTION INTRAVENOUS; SUBCUTANEOUS at 21:38

## 2017-12-04 RX ADMIN — BUDESONIDE AND FORMOTEROL FUMARATE DIHYDRATE SCH PUFF: 80; 4.5 AEROSOL RESPIRATORY (INHALATION) at 10:17

## 2017-12-04 RX ADMIN — INSULIN ASPART SCH UNITS: 100 INJECTION, SOLUTION INTRAVENOUS; SUBCUTANEOUS at 16:59

## 2017-12-04 RX ADMIN — BUDESONIDE AND FORMOTEROL FUMARATE DIHYDRATE SCH PUFF: 80; 4.5 AEROSOL RESPIRATORY (INHALATION) at 21:35

## 2017-12-04 RX ADMIN — DILTIAZEM HYDROCHLORIDE SCH MG: 30 TABLET, FILM COATED ORAL at 21:35

## 2017-12-04 NOTE — PN
Progress Note, Physician


Chief Complaint: 





Presently comfortable


History of Present Illness: 





This is a 67-year-old woman with a PMHx of asthma, COPD, tachycardia, likely 

MAT in October 2017 and HTN, who was admitted 12/1/2017 with a cough productive 

of white sputum, wheezing, and bilateral rib pain with normal nuclear stress 

test in August 2017. She was found to have recurrent tachycardia, long MI, 

likely atrial tachycardia at 172 BPM. She has been stable from cardiac stand 

point since admission without recurrent tachycardia on telemetry.  





Continue current therapy for asthma, still with an expiratory wheeze. 





12/4/17 Presently in NSR at 60 BPM. 





- Current Medication List


Current Medications: 


Active Medications





Acetaminophen (Tylenol -)  650 mg PO Q6H PRN


   PRN Reason: FEVER OR PAIN


Budesonide/Formoterol Fumarate (Symbicort 80/4.5mcg -)  2 puff IH BID [facility]


   Last Admin: 12/04/17 10:17 Dose:  2 puff


Diltiazem HCl (Cardizem -)  30 mg PO BID ADAM


Hydroxyzine HCl (Atarax -)  25 mg PO BID PRN


   PRN Reason: FOR ITCHING


Insulin Aspart (Novolog Vial Sliding Scale -)  1 vial SQ ACHS ADAM


   PRN Reason: Protocol


   Last Admin: 12/04/17 12:19 Dose:  6 units


Methylprednisolone Sodium Succinate (Solu-Medrol -)  40 mg IVPUSH Q12H [facility]


   Last Admin: 12/04/17 09:48 Dose:  40 mg


Montelukast Sodium (Singulair -)  10 mg PO HS [facility]


   Last Admin: 12/03/17 21:37 Dose:  10 mg


Sulfasalazine (Azulfidine En-Tabs -)  500 mg PO BID [facility]


   Last Admin: 12/04/17 09:48 Dose:  Not Given











- Objective


Vital Signs: 


 Vital Signs











Temperature  98.7 F   12/04/17 14:00


 


Pulse Rate  59 L  12/04/17 14:00


 


Respiratory Rate  20   12/04/17 14:00


 


Blood Pressure  126/61   12/04/17 14:00


 


O2 Sat by Pulse Oximetry (%)  93 L  12/04/17 09:00











Constitutional: Yes: No Distress


Neck: Yes: WNL


Cardiovascular: Yes: Regular Rate and Rhythm (NL S1S2, No MRHG)


Respiratory: Yes: Wheezes (Bilateral expiratory)


Gastrointestinal: Yes: Soft


Edema: No


Neurological: Yes: Alert, Oriented (Grossly nonfocal)


Labs: 


 CBC, BMP





 12/02/17 11:40 





 12/01/17 20:14 





 INR, PTT











INR  1.12  (0.82-1.09)   12/01/17  20:14    














Assessment/Plan











Assessment/Plan


67-year-old woman with a PMHx of asthma, COPD, tachycardia, likely MAT in 

October 2017 and HTN, who was admitted 12/1/2017 with a cough productive of 

white sputum, wheezing, and bilateral rib pain with normal nuclear stress test 

in August 2017. She was found to have recurrent tachycardia, long MI, likely 

atrial tachycardia at 172 BPM. She has been stable from cardiac stand point 

since admission without recurrent tachycardia on telemetry.  





Short lasting tachycardia, long RP, likely atrial tachycardia. No evidence of 

atrial flutter. 


Continue Diltiazem to prevent recurrent tachycardia since she has severe asthma

, can not take beta blockers





Presently in NST at 62 BPM on telem.

## 2017-12-04 NOTE — PN
Progress Note, Physician


History of Present Illness: 





PULMONARY





ALERT,-RESP DISTRESS,-PALPITATIONS





- Current Medication List


Current Medications: 


Active Medications





Acetaminophen (Tylenol -)  650 mg PO Q6H PRN


   PRN Reason: FEVER OR PAIN


Budesonide/Formoterol Fumarate (Symbicort 80/4.5mcg -)  2 puff IH BID [facility]


   Last Admin: 12/04/17 10:17 Dose:  2 puff


Diltiazem HCl (Cardizem Cd -)  120 mg PO DAILY [facility]


   Last Admin: 12/04/17 10:21 Dose:  Not Given


Hydroxyzine HCl (Atarax -)  25 mg PO BID PRN


   PRN Reason: FOR ITCHING


Insulin Aspart (Novolog Vial Sliding Scale -)  1 vial SQ ACHS ADAM


   PRN Reason: Protocol


   Last Admin: 12/04/17 06:30 Dose:  8 units


Methylprednisolone Sodium Succinate (Solu-Medrol -)  40 mg IVPUSH Q12H [facility]


   Last Admin: 12/04/17 09:48 Dose:  40 mg


Montelukast Sodium (Singulair -)  10 mg PO HS [facility]


   Last Admin: 12/03/17 21:37 Dose:  10 mg


Sulfasalazine (Azulfidine En-Tabs -)  500 mg PO BID [facility]


   Last Admin: 12/04/17 09:48 Dose:  Not Given











- Objective


Vital Signs: 


 Vital Signs











Temperature  98.3 F   12/04/17 07:30


 


Pulse Rate  53 L  12/04/17 07:30


 


Respiratory Rate  16   12/04/17 07:30


 


Blood Pressure  128/70   12/04/17 07:30


 


O2 Sat by Pulse Oximetry (%)  93 L  12/04/17 09:00











Constitutional: Yes: Well Nourished, Calm


Eyes: Yes: WNL


HENT: Yes: WNL


Neck: Yes: WNL


Cardiovascular: Yes: Regular Rate and Rhythm, S1, S2


Respiratory: Yes: Wheezes (FEW SCATTERED DEVYN WHEEZES)


Gastrointestinal: Yes: Normal Bowel Sounds, Soft


Extremities: Yes: WNL


Edema: No


Labs: 


 CBC, BMP





 12/02/17 11:40 





 12/01/17 20:14 





 INR, PTT











INR  1.12  (0.82-1.09)   12/01/17  20:14    














Problem List





- Problems


(1) Tachycardia


Code(s): R00.0 - TACHYCARDIA, UNSPECIFIED   





(2) Asthma exacerbation


Code(s): J45.901 - UNSPECIFIED ASTHMA WITH (ACUTE) EXACERBATION   


Qualifiers: 


   Asthma severity: moderate 





(3) Respiratory distress


Code(s): R06.00 - DYSPNEA, UNSPECIFIED   





(4) Second hand smoke exposure


Code(s): Z77.22 - CNTCT W AND EXPSR TO ENVIRON TOBACCO SMOKE (ACUTE) (CHRONIC) 

  





Assessment/Plan








IMP ASTHMA EXACERBATION IMPROVED


      CARDIAC ARRYTHYMIA ?MAT


      HTN


      H/O RML ATELECTASIS





PLAN  PREDNISONE 60 MG DAILY


        INHALED BRONCHODILATORS


        O2


        PFTS OUTPATIENT


        IGE LEVEL OUTPATIENT


        MONITOR PEAK FLOW


       CHEST CT OUTPATIENT


       CARDIZEM








DR MAI


      





 Problem List 





- Problems


(1) Tachycardia


Code(s): R00.0 - TACHYCARDIA, UNSPECIFIED   





(2) Asthma exacerbation


Code(s): J45.901 - UNSPECIFIED ASTHMA WITH (ACUTE) EXACERBATION   





(3) Respiratory distress


Code(s): R06.00 - DYSPNEA, UNSPECIFIED   





(4) Second hand smoke exposure


Code(s): Z77.22 - CNTCT W AND EXPSR TO ENVIRON TOBACCO SMOKE (ACUTE) (CHRONIC)

## 2017-12-04 NOTE — PN
Progress Note, Physician


Chief Complaint: 





AWAKE ALERT


BRADYCARDIA OVERNIGHT





- Current Medication List


Current Medications: 


Active Medications





Acetaminophen (Tylenol -)  650 mg PO Q6H PRN


   PRN Reason: FEVER OR PAIN


Budesonide/Formoterol Fumarate (Symbicort 80/4.5mcg -)  2 puff IH BID [facility]


   Last Admin: 12/04/17 10:17 Dose:  2 puff


Hydroxyzine HCl (Atarax -)  25 mg PO BID [facility]N


   PRN Reason: FOR ITCHING


Insulin Aspart (Novolog Vial Sliding Scale -)  1 vial SQ ACHS ADAM


   PRN Reason: Protocol


   Last Admin: 12/04/17 12:19 Dose:  6 units


Methylprednisolone Sodium Succinate (Solu-Medrol -)  40 mg IVPUSH Q12H [facility]


   Last Admin: 12/04/17 09:48 Dose:  40 mg


Montelukast Sodium (Singulair -)  10 mg PO HS [facility]


   Last Admin: 12/03/17 21:37 Dose:  10 mg


Sulfasalazine (Azulfidine En-Tabs -)  500 mg PO BID [facility]


   Last Admin: 12/04/17 09:48 Dose:  Not Given











- Objective


Vital Signs: 


 Vital Signs











Temperature  98.7 F   12/04/17 14:00


 


Pulse Rate  59 L  12/04/17 14:00


 


Respiratory Rate  20   12/04/17 14:00


 


Blood Pressure  126/61   12/04/17 14:00


 


O2 Sat by Pulse Oximetry (%)  93 L  12/04/17 09:00











Constitutional: Yes: No Distress


Eyes: Yes: WNL


HENT: Yes: WNL


Neck: Yes: WNL


Cardiovascular: Yes: WNL, Bradycardia


Respiratory: Yes: Wheezes


Gastrointestinal: Yes: WNL


Genitourinary: Yes: WNL


Musculoskeletal: Yes: WNL


Extremities: Yes: WNL


Edema: No


Peripheral Pulses WNL: Yes


Integumentary: Yes: WNL


Wound/Incision: Yes: Clean/Dry


Neurological: Yes: WNL


...Motor Strength: WNL


Psychiatric: Yes: WNL


Labs: 


 CBC, BMP





 12/02/17 11:40 





 12/01/17 20:14 





 INR, PTT











INR  1.12  (0.82-1.09)   12/01/17  20:14    














Problem List





- Problems


(1) Tachycardia


Code(s): R00.0 - TACHYCARDIA, UNSPECIFIED   





(2) Asthma exacerbation


Code(s): J45.901 - UNSPECIFIED ASTHMA WITH (ACUTE) EXACERBATION   


Qualifiers: 


   Asthma severity: moderate 





(3) Diabetes type 2, controlled


Code(s): E11.9 - TYPE 2 DIABETES MELLITUS WITHOUT COMPLICATIONS   


Qualifiers: 


   Diabetes mellitus complication status: without complication 





Assessment/Plan





CARDIZEM CD 120MG STOPPED


START CARDIZEM 30MG BID


MONITOR TONIGHT


DC PLANNING TOMORROW


TAPER STEROIDS

## 2017-12-05 VITALS — SYSTOLIC BLOOD PRESSURE: 132 MMHG | DIASTOLIC BLOOD PRESSURE: 52 MMHG | HEART RATE: 56 BPM

## 2017-12-05 VITALS — TEMPERATURE: 97.6 F

## 2017-12-05 LAB
ANION GAP SERPL CALC-SCNC: 9 MMOL/L (ref 8–16)
CALCIUM SERPL-MCNC: 8 MG/DL (ref 8.5–10.1)
CO2 SERPL-SCNC: 28 MMOL/L (ref 21–32)
CREAT SERPL-MCNC: 0.7 MG/DL (ref 0.55–1.02)
GLUCOSE SERPL-MCNC: 272 MG/DL (ref 74–106)
MAGNESIUM SERPL-MCNC: 2 MG/DL (ref 1.8–2.4)

## 2017-12-05 RX ADMIN — METHYLPREDNISOLONE SODIUM SUCCINATE SCH MG: 40 INJECTION, POWDER, FOR SOLUTION INTRAMUSCULAR; INTRAVENOUS at 11:04

## 2017-12-05 RX ADMIN — BUDESONIDE AND FORMOTEROL FUMARATE DIHYDRATE SCH PUFF: 80; 4.5 AEROSOL RESPIRATORY (INHALATION) at 09:24

## 2017-12-05 RX ADMIN — DILTIAZEM HYDROCHLORIDE SCH MG: 30 TABLET, FILM COATED ORAL at 09:23

## 2017-12-05 RX ADMIN — INSULIN ASPART SCH UNITS: 100 INJECTION, SOLUTION INTRAVENOUS; SUBCUTANEOUS at 11:08

## 2017-12-05 RX ADMIN — INSULIN ASPART SCH UNITS: 100 INJECTION, SOLUTION INTRAVENOUS; SUBCUTANEOUS at 06:27

## 2017-12-05 NOTE — DS
Physical Examination


Vital Signs: 


 Vital Signs











Temperature  97.6 F   12/05/17 06:06


 


Pulse Rate  53 L  12/05/17 06:06


 


Respiratory Rate  20   12/05/17 06:06


 


Blood Pressure  134/67   12/05/17 06:06


 


O2 Sat by Pulse Oximetry (%)  94 L  12/04/17 20:11











Constitutional: Yes: No Distress


Eyes: Yes: WNL


HENT: Yes: WNL


Neck: Yes: WNL


Cardiovascular: Yes: WNL


Respiratory: Yes: WNL


Gastrointestinal: Yes: WNL


Renal/: Yes: WNL


Musculoskeletal: Yes: WNL


Extremities: Yes: WNL


Edema: No


Peripheral Pulses WNL: Yes


Integumentary: Yes: WNL


Wound/Incision: Yes: Clean/Dry


Neurological: Yes: WNL


...Motor Strength: WNL


Psychiatric: Yes: WNL


Labs: 


 CBC, BMP





 12/02/17 11:40 











Discharge Summary


Reason For Visit: TACHYCARDIA


Current Active Problems





Tachycardia (Acute)


SVT


ACUTE ASTHMA








Procedures: Principal: TELEMETRY MONITORING


Other Procedures: LABS


Hospital Course: 





ADMITTED FOR SVT AND ACUTE ASTHMA, TREATED WITH RATE CONTROL CA CHANNEL 

BLOCKERS AND STEROIDS IV, RESP SUPPOR, PATIENT MONITORED TELEMETRY SETTING, 

WILL DC HOME F/U 1 WEEK WITH CARDIOLOGY AND DR EDEN


Condition: Stable





- Instructions


Diet, Activity, Other Instructions: 


SEE DR EDEN AND CARDIOLOGY NEXT WEEK WEDNESDAY


LOW SALT DIET ADA


Referrals: 


Halima Eden MD [Primary Care Provider] - 


Disposition: HOME





- Home Medications


Comprehensive Discharge Medication List: 


Ambulatory Orders





Acetaminophen [Tylenol .Regular Strength -] 650 mg PO Q6H PRN #0 tablet 10/27/

17 


Albuterol 2.5/Ipratropium 0.5 [Duoneb -] 1 amp NEB Q4H PRN #0 amp 10/27/17 


Budesonide/Formeterol Fumarate [SYMBICORT 160/4.5mcg -] 2 puff IH BID #1 

inhaler 10/27/17 


Hydroxyzine HCl [Atarax -] 25 mg PO HS PRN #30 tablet 10/27/17 


Insulin (Novolog) [Novolog Flexpen -] 5 units SQ ACHS #1 pen 10/27/17 


Montelukast Na [Singulair -] 10 mg PO HS #30 tablet 10/27/17 


Beclomethasone Dipropionate [Qvar] 40 mcg IH DAILY 12/01/17 


Cyclobenzaprine HCl [Flexeril -] 10 mg PO HS 12/01/17 


Sulfasalazine [Azulfidine] 500 mg PO DAILY 12/01/17 


Valacyclovir HCl [Valtrex] 500 mg PO DAILY 12/01/17 


Acetaminophen [Tylenol .Regular Strength -] 650 mg PO Q6H PRN  tablet 12/05/17 


Budesonide/Formeterol Fumarate [SYMBICORT 80/4.5mcg -] 2 puff IH BID  inhaler 12 /05/17 


Diltiazem [Cardizem -] 30 mg PO BID #60 tablet 12/05/17 


Hydroxyzine HCl [Atarax -] 25 mg PO BID PRN  tablet 12/05/17 


Montelukast Na [Singulair -] 10 mg PO HS  tablet 12/05/17 


Prednisone [Deltasone -] 20 mg PO DAILY #5 tablet 12/05/17 


Sulfasalazine [Azulfidine En-Tabs -] 500 mg PO BID  tablet. 12/05/17

## 2022-10-21 ENCOUNTER — HOSPITAL ENCOUNTER (OUTPATIENT)
Dept: HOSPITAL 74 - JER | Age: 72
Setting detail: OBSERVATION
LOS: 3 days | Discharge: HOME | End: 2022-10-24
Attending: FAMILY MEDICINE | Admitting: INTERNAL MEDICINE
Payer: COMMERCIAL

## 2022-10-21 VITALS — BODY MASS INDEX: 23.4 KG/M2

## 2022-10-21 DIAGNOSIS — K57.92: ICD-10-CM

## 2022-10-21 DIAGNOSIS — N39.0: Primary | ICD-10-CM

## 2022-10-21 DIAGNOSIS — E11.9: ICD-10-CM

## 2022-10-21 DIAGNOSIS — K52.9: ICD-10-CM

## 2022-10-21 DIAGNOSIS — I10: ICD-10-CM

## 2022-10-21 DIAGNOSIS — J45.909: ICD-10-CM

## 2022-10-21 LAB
ALBUMIN SERPL-MCNC: 3.3 G/DL (ref 3.4–5)
ALP SERPL-CCNC: 68 U/L (ref 45–117)
ALT SERPL-CCNC: 28 U/L (ref 13–61)
ANION GAP SERPL CALC-SCNC: 7 MMOL/L (ref 8–16)
APPEARANCE UR: CLEAR
APTT BLD: 28.4 SECONDS (ref 25.2–36.5)
AST SERPL-CCNC: 17 U/L (ref 15–37)
BACTERIA # UR AUTO: 26 /UL (ref 0–1359)
BASOPHILS # BLD: 0.4 % (ref 0–2)
BILIRUB SERPL-MCNC: 0.3 MG/DL (ref 0.2–1)
BILIRUB UR STRIP.AUTO-MCNC: NEGATIVE MG/DL
BUN SERPL-MCNC: 19.6 MG/DL (ref 7–18)
CALCIUM SERPL-MCNC: 9.1 MG/DL (ref 8.5–10.1)
CASTS URNS QL MICRO: 0 /UL (ref 0–3.1)
CHLORIDE SERPL-SCNC: 104 MMOL/L (ref 98–107)
CO2 SERPL-SCNC: 28 MMOL/L (ref 21–32)
COLOR UR: YELLOW
CREAT SERPL-MCNC: 0.6 MG/DL (ref 0.55–1.3)
DEPRECATED RDW RBC AUTO: 13.4 % (ref 11.6–15.6)
EOSINOPHIL # BLD: 1.7 % (ref 0–4.5)
EPITH CASTS URNS QL MICRO: 13 /UL (ref 0–25.1)
GLUCOSE SERPL-MCNC: 178 MG/DL (ref 74–106)
HCT VFR BLD CALC: 41.7 % (ref 32.4–45.2)
HGB BLD-MCNC: 13.9 GM/DL (ref 10.7–15.3)
INR BLD: 1.07 (ref 0.83–1.09)
KETONES UR QL STRIP: NEGATIVE
LEUKOCYTE ESTERASE UR QL STRIP.AUTO: (no result)
LIPASE SERPL-CCNC: 172 U/L (ref 73–393)
LYMPHOCYTES # BLD: 33.2 % (ref 8–40)
MCH RBC QN AUTO: 31.4 PG (ref 25.7–33.7)
MCHC RBC AUTO-ENTMCNC: 33.3 G/DL (ref 32–36)
MCV RBC: 94.3 FL (ref 80–96)
MONOCYTES # BLD AUTO: 9.2 % (ref 3.8–10.2)
NEUTROPHILS # BLD: 55.5 % (ref 42.8–82.8)
NITRITE UR QL STRIP: NEGATIVE
PH UR: 5.5 [PH] (ref 5–8)
PLATELET # BLD AUTO: 215 10^3/UL (ref 134–434)
PMV BLD: 6.9 FL (ref 7.5–11.1)
PROT SERPL-MCNC: 6.7 G/DL (ref 6.4–8.2)
PROT UR QL STRIP: (no result)
PROT UR QL STRIP: NEGATIVE
PT PNL PPP: 12.3 SEC (ref 9.7–13)
RBC # BLD AUTO: 12 /UL (ref 0–23.9)
RBC # BLD AUTO: 4.42 M/MM3 (ref 3.6–5.2)
SODIUM SERPL-SCNC: 139 MMOL/L (ref 136–145)
SP GR UR: 1.02 (ref 1.01–1.03)
UROBILINOGEN UR STRIP-MCNC: 0.2 MG/DL (ref 0.2–1)
WBC # BLD AUTO: 8.9 K/MM3 (ref 4–10)
WBC # UR AUTO: 110 /UL (ref 0–25.8)

## 2022-10-21 PROCEDURE — U0003 INFECTIOUS AGENT DETECTION BY NUCLEIC ACID (DNA OR RNA); SEVERE ACUTE RESPIRATORY SYNDROME CORONAVIRUS 2 (SARS-COV-2) (CORONAVIRUS DISEASE [COVID-19]), AMPLIFIED PROBE TECHNIQUE, MAKING USE OF HIGH THROUGHPUT TECHNOLOGIES AS DESCRIBED BY CMS-2020-01-R: HCPCS

## 2022-10-21 PROCEDURE — U0005 INFEC AGEN DETEC AMPLI PROBE: HCPCS

## 2022-10-21 PROCEDURE — G0378 HOSPITAL OBSERVATION PER HR: HCPCS

## 2022-10-22 LAB
ALBUMIN SERPL-MCNC: 3.1 G/DL (ref 3.4–5)
ALP SERPL-CCNC: 75 U/L (ref 45–117)
ALT SERPL-CCNC: 27 U/L (ref 13–61)
ANION GAP SERPL CALC-SCNC: 7 MMOL/L (ref 8–16)
AST SERPL-CCNC: 12 U/L (ref 15–37)
BILIRUB SERPL-MCNC: 0.3 MG/DL (ref 0.2–1)
BUN SERPL-MCNC: 10.5 MG/DL (ref 7–18)
CALCIUM SERPL-MCNC: 8.4 MG/DL (ref 8.5–10.1)
CHLORIDE SERPL-SCNC: 107 MMOL/L (ref 98–107)
CO2 SERPL-SCNC: 28 MMOL/L (ref 21–32)
CREAT SERPL-MCNC: 0.5 MG/DL (ref 0.55–1.3)
DEPRECATED RDW RBC AUTO: 13.5 % (ref 11.6–15.6)
GLUCOSE SERPL-MCNC: 189 MG/DL (ref 74–106)
HCT VFR BLD CALC: 40.4 % (ref 32.4–45.2)
HGB BLD-MCNC: 13.2 GM/DL (ref 10.7–15.3)
MCH RBC QN AUTO: 30.7 PG (ref 25.7–33.7)
MCHC RBC AUTO-ENTMCNC: 32.6 G/DL (ref 32–36)
MCV RBC: 94.1 FL (ref 80–96)
PLATELET # BLD AUTO: 223 10^3/UL (ref 134–434)
PMV BLD: 7.4 FL (ref 7.5–11.1)
PROT SERPL-MCNC: 6.2 G/DL (ref 6.4–8.2)
RBC # BLD AUTO: 4.29 M/MM3 (ref 3.6–5.2)
SODIUM SERPL-SCNC: 141 MMOL/L (ref 136–145)
WBC # BLD AUTO: 6.3 K/MM3 (ref 4–10)

## 2022-10-22 PROCEDURE — 3E0337Z INTRODUCTION OF ELECTROLYTIC AND WATER BALANCE SUBSTANCE INTO PERIPHERAL VEIN, PERCUTANEOUS APPROACH: ICD-10-PCS | Performed by: FAMILY MEDICINE

## 2022-10-22 PROCEDURE — 3E023GC INTRODUCTION OF OTHER THERAPEUTIC SUBSTANCE INTO MUSCLE, PERCUTANEOUS APPROACH: ICD-10-PCS | Performed by: FAMILY MEDICINE

## 2022-10-22 PROCEDURE — 3E03329 INTRODUCTION OF OTHER ANTI-INFECTIVE INTO PERIPHERAL VEIN, PERCUTANEOUS APPROACH: ICD-10-PCS | Performed by: FAMILY MEDICINE

## 2022-10-22 PROCEDURE — 3E033NZ INTRODUCTION OF ANALGESICS, HYPNOTICS, SEDATIVES INTO PERIPHERAL VEIN, PERCUTANEOUS APPROACH: ICD-10-PCS | Performed by: FAMILY MEDICINE

## 2022-10-22 RX ADMIN — ENOXAPARIN SODIUM SCH MG: 30 INJECTION SUBCUTANEOUS at 09:41

## 2022-10-22 RX ADMIN — DEXTROSE AND SODIUM CHLORIDE SCH MLS/HR: 5; 900 INJECTION, SOLUTION INTRAVENOUS at 08:03

## 2022-10-22 RX ADMIN — INSULIN ASPART SCH: 100 INJECTION, SOLUTION INTRAVENOUS; SUBCUTANEOUS at 11:02

## 2022-10-22 RX ADMIN — LOSARTAN POTASSIUM SCH MG: 50 TABLET, FILM COATED ORAL at 09:41

## 2022-10-22 RX ADMIN — INSULIN ASPART SCH: 100 INJECTION, SOLUTION INTRAVENOUS; SUBCUTANEOUS at 06:18

## 2022-10-22 RX ADMIN — INSULIN ASPART SCH: 100 INJECTION, SOLUTION INTRAVENOUS; SUBCUTANEOUS at 16:41

## 2022-10-23 VITALS — RESPIRATION RATE: 18 BRPM

## 2022-10-23 RX ADMIN — INSULIN ASPART SCH: 100 INJECTION, SOLUTION INTRAVENOUS; SUBCUTANEOUS at 11:18

## 2022-10-23 RX ADMIN — INSULIN ASPART SCH: 100 INJECTION, SOLUTION INTRAVENOUS; SUBCUTANEOUS at 06:26

## 2022-10-23 RX ADMIN — CEFTRIAXONE SCH MLS/HR: 1 INJECTION, POWDER, FOR SOLUTION INTRAMUSCULAR; INTRAVENOUS at 10:35

## 2022-10-23 RX ADMIN — INSULIN ASPART SCH: 100 INJECTION, SOLUTION INTRAVENOUS; SUBCUTANEOUS at 16:33

## 2022-10-23 RX ADMIN — ENOXAPARIN SODIUM SCH MG: 30 INJECTION SUBCUTANEOUS at 09:22

## 2022-10-23 RX ADMIN — DEXTROSE AND SODIUM CHLORIDE SCH: 5; 900 INJECTION, SOLUTION INTRAVENOUS at 09:21

## 2022-10-23 RX ADMIN — DEXTROSE AND SODIUM CHLORIDE SCH MLS/HR: 5; 900 INJECTION, SOLUTION INTRAVENOUS at 22:08

## 2022-10-23 RX ADMIN — LOSARTAN POTASSIUM SCH MG: 50 TABLET, FILM COATED ORAL at 09:30

## 2022-10-24 VITALS — SYSTOLIC BLOOD PRESSURE: 140 MMHG | TEMPERATURE: 98 F | HEART RATE: 72 BPM | DIASTOLIC BLOOD PRESSURE: 60 MMHG

## 2022-10-24 LAB
DEPRECATED RDW RBC AUTO: 13.7 % (ref 11.6–15.6)
ERYTHROCYTE [SEDIMENTATION RATE] IN BLOOD BY WESTERGREN METHOD: 27 MM/HR (ref 0–30)
HCT VFR BLD CALC: 42.8 % (ref 32.4–45.2)
HGB BLD-MCNC: 14.3 GM/DL (ref 10.7–15.3)
MCH RBC QN AUTO: 31.4 PG (ref 25.7–33.7)
MCHC RBC AUTO-ENTMCNC: 33.3 G/DL (ref 32–36)
MCV RBC: 94.1 FL (ref 80–96)
PLATELET # BLD AUTO: 193 10^3/UL (ref 134–434)
PMV BLD: 7.1 FL (ref 7.5–11.1)
RBC # BLD AUTO: 4.55 M/MM3 (ref 3.6–5.2)
WBC # BLD AUTO: 5.6 K/MM3 (ref 4–10)

## 2022-10-24 RX ADMIN — DEXTROSE AND SODIUM CHLORIDE SCH MLS/HR: 5; 900 INJECTION, SOLUTION INTRAVENOUS at 09:20

## 2022-10-24 RX ADMIN — INSULIN ASPART SCH: 100 INJECTION, SOLUTION INTRAVENOUS; SUBCUTANEOUS at 11:45

## 2022-10-24 RX ADMIN — INSULIN ASPART SCH: 100 INJECTION, SOLUTION INTRAVENOUS; SUBCUTANEOUS at 06:31

## 2022-10-24 RX ADMIN — ENOXAPARIN SODIUM SCH MG: 30 INJECTION SUBCUTANEOUS at 09:21

## 2022-10-24 RX ADMIN — LOSARTAN POTASSIUM SCH MG: 50 TABLET, FILM COATED ORAL at 09:21

## 2022-10-24 RX ADMIN — CEFTRIAXONE SCH MLS/HR: 1 INJECTION, POWDER, FOR SOLUTION INTRAMUSCULAR; INTRAVENOUS at 09:21

## 2022-10-24 RX ADMIN — INSULIN ASPART SCH: 100 INJECTION, SOLUTION INTRAVENOUS; SUBCUTANEOUS at 17:00

## 2022-10-25 LAB — CEA SERPL-MCNC: 3.8 NG/ML (ref 0–4.7)

## 2025-02-20 ENCOUNTER — NON-APPOINTMENT (OUTPATIENT)
Age: 75
End: 2025-02-20

## 2025-02-20 ENCOUNTER — APPOINTMENT (OUTPATIENT)
Dept: OPHTHALMOLOGY | Facility: CLINIC | Age: 75
End: 2025-02-20
Payer: MEDICARE

## 2025-02-20 PROCEDURE — 92083 EXTENDED VISUAL FIELD XM: CPT

## 2025-02-20 PROCEDURE — 92060 SENSORIMOTOR EXAMINATION: CPT

## 2025-02-20 PROCEDURE — 99204 OFFICE O/P NEW MOD 45 MIN: CPT

## 2025-02-20 PROCEDURE — 92250 FUNDUS PHOTOGRAPHY W/I&R: CPT

## 2025-03-05 PROBLEM — Z00.00 ENCOUNTER FOR PREVENTIVE HEALTH EXAMINATION: Status: ACTIVE | Noted: 2025-03-05

## 2025-03-20 ENCOUNTER — APPOINTMENT (OUTPATIENT)
Dept: OPHTHALMOLOGY | Facility: CLINIC | Age: 75
End: 2025-03-20
Payer: MEDICARE

## 2025-03-20 ENCOUNTER — NON-APPOINTMENT (OUTPATIENT)
Age: 75
End: 2025-03-20

## 2025-03-20 PROCEDURE — 92083 EXTENDED VISUAL FIELD XM: CPT

## 2025-03-20 PROCEDURE — 92060 SENSORIMOTOR EXAMINATION: CPT

## 2025-03-20 PROCEDURE — 92133 CPTRZD OPH DX IMG PST SGM ON: CPT

## 2025-03-20 PROCEDURE — 76514 ECHO EXAM OF EYE THICKNESS: CPT

## 2025-03-20 PROCEDURE — 92014 COMPRE OPH EXAM EST PT 1/>: CPT

## 2025-05-05 ENCOUNTER — APPOINTMENT (OUTPATIENT)
Dept: OPHTHALMOLOGY | Facility: CLINIC | Age: 75
End: 2025-05-05